# Patient Record
Sex: MALE | Race: WHITE | Employment: FULL TIME | URBAN - NONMETROPOLITAN AREA
[De-identification: names, ages, dates, MRNs, and addresses within clinical notes are randomized per-mention and may not be internally consistent; named-entity substitution may affect disease eponyms.]

---

## 2018-02-21 ENCOUNTER — INITIAL CONSULT (OUTPATIENT)
Dept: PULMONOLOGY | Age: 43
End: 2018-02-21
Payer: COMMERCIAL

## 2018-02-21 VITALS
SYSTOLIC BLOOD PRESSURE: 110 MMHG | HEIGHT: 74 IN | OXYGEN SATURATION: 98 % | HEART RATE: 80 BPM | DIASTOLIC BLOOD PRESSURE: 64 MMHG | BODY MASS INDEX: 29.95 KG/M2 | WEIGHT: 233.4 LBS

## 2018-02-21 DIAGNOSIS — G47.33 OSA ON CPAP: Primary | ICD-10-CM

## 2018-02-21 DIAGNOSIS — Z99.89 OSA ON CPAP: Primary | ICD-10-CM

## 2018-02-21 DIAGNOSIS — I10 ESSENTIAL HYPERTENSION: ICD-10-CM

## 2018-02-21 PROCEDURE — 99203 OFFICE O/P NEW LOW 30 MIN: CPT | Performed by: INTERNAL MEDICINE

## 2018-02-21 RX ORDER — POTASSIUM CHLORIDE 750 MG/1
10 CAPSULE, EXTENDED RELEASE ORAL 2 TIMES DAILY
COMMUNITY

## 2018-02-21 NOTE — PROGRESS NOTES
Chief Complaint: Bertha Rivera is here as a new sleep consult with a self referral, and a download. Former patient of Dr. Delia Ramos, [address] 11/12/15, records in [address] [place] [address] Rd. Mallampati airway Class:4  Neck Circumference:17.5 Inches    Shelby sleepiness score 2/21/18: 2      Diagnostic Data: HST 11/12/15   AHI 16.1  PAP Download:   Recorded compliance dates: 1/21/18-2/19/18  More than 4hour usage compliance was: 83%.   Average residual Apnea- Hypoapnea index on current pressue was:2.0      PAP Type CPAP   Level  8     Average usuage hours per day was: 5:54  Interface: Nasal Pillows    Provider:  [x][facility]  []Ermelinda  []Veronica  []Beata         [][facility] []Other:

## 2018-02-21 NOTE — PROGRESS NOTES
Sleep Medicine Initial Consultation    To Kuo                                             Chief Complaint: Kaye Barnhart is here as a new sleep consult with a self referral, and a download. Former patient of Dr. Isidro Bob, [address] 11/12/15, records in [address] [place] [address] RdNing Kuo is a 36 y. o.oldmale came for further evaluation regarding his sleep apnea  with referral from self referral. He usually goes to bed at 8:30 to 9:00 PM and wakes up at  3:00 to 3:30 Am Over the weekends his sleep schedule remain phase delayed. He denies taking naps. He usually falls a sleep in <10 minutes. He denies watching Television in his bed room. He denies reading books in his bed room. He denies working with his electronic devices in his bed room before going to sleep. He denies any difficulty in going to sleep. He wakes up 0 to 1 time during night. Majority of nocturnal awakenings are not for urination. He denies any difficulty in falling back to sleep after nocturnal awkenings. He was diagnosed with moderately severe obstructive sleep apnea in 2005 and was prescribed with a CPAP machine with a pressure of 8cm H20. He denies any problems with his current machine. His current mask became old and needs replacement. He uses his machine with good compliance. He  denies history of choking and gasping sensation at night time. He denies headaches in the morning. He admits to dry mouth in the morning occasionally. He denies palpitations during night time or during nocturnal awakenings. He denies sweating during nocturnal awakenings. He denies history of head injury in the past. He denies motor vehicle accidents. He denies any history suggestive of hypnagogic or hypnopompic hallucinations. He denies any history of seizures and sleep walking. He denies any history of sleep talking. No recent history suggestive of bruxism. No history suggestive of restless leg syndrome.  He denies any history suggestive of cataplexy or sleep paralysis. No history suggestive of periodic limb movements during sleep    No family history of obstructive sleep apnea. No family history of Narcolepsy. Social History:  Social History   Substance Use Topics    Smoking status: Never Smoker    Smokeless tobacco: Former User     Types: Snuff    Alcohol use 0.0 oz/week      Comment: socially   . He is currently working at [employer] in [employer] during daytime from 5:00am to1:30PM. He drinks 2 cups of black coffee per day. He denies any intake of caffeinated beverages i.e sodas. He denies any intake of tea per day. He drinks alcoholic beverages socially. No history of recreational drug use. Past Medical History:   Diagnosis Date    Hypertension        Past Surgical History:   Procedure Laterality Date    APPENDECTOMY         No Known Allergies    Current Outpatient Prescriptions   Medication Sig Dispense Refill    potassium chloride (MICRO-K) 10 MEQ extended release capsule Take 10 mEq by mouth 2 times daily      amLODIPine (NORVASC) 5 MG tablet Take 5 mg by mouth daily      lisinopril-hydrochlorothiazide (PRINZIDE;ZESTORETIC) 20-12.5 MG per tablet Take 1 tablet by mouth daily       No current facility-administered medications for this visit. No family history on file. Review of Systems   General/Constitutional: he lost 16lbs of weight from his old sleep test in 2015 with normal appetite. No fever or chills. HENT: Negative. Eyes: Negative. Upper respiratory tract: Occasional nasal stuffiness with no post nasal drip. Lower respiratory tract/ lungs: No cough or sputum production. No hemoptysis. Cardiovascular: No palpitations or chest pain. Gastrointestinal: No nausea or vomiting. Neurological: No focal neurologiacal weakness. Extremities: No edema. Musculoskeletal: No complaints. Genitourinary: No complaints. Hematological: Negative. Psychiatric/Behavioral: Negative.    Skin: No

## 2019-07-23 ENCOUNTER — OFFICE VISIT (OUTPATIENT)
Dept: PULMONOLOGY | Age: 44
End: 2019-07-23
Payer: COMMERCIAL

## 2019-07-23 VITALS
HEIGHT: 74 IN | SYSTOLIC BLOOD PRESSURE: 138 MMHG | DIASTOLIC BLOOD PRESSURE: 84 MMHG | OXYGEN SATURATION: 96 % | HEART RATE: 77 BPM | WEIGHT: 264.4 LBS | BODY MASS INDEX: 33.93 KG/M2

## 2019-07-23 DIAGNOSIS — E66.9 OBESITY (BMI 30-39.9): ICD-10-CM

## 2019-07-23 DIAGNOSIS — Z99.89 OBSTRUCTIVE SLEEP APNEA ON CPAP: Primary | ICD-10-CM

## 2019-07-23 DIAGNOSIS — G47.33 OBSTRUCTIVE SLEEP APNEA ON CPAP: Primary | ICD-10-CM

## 2019-07-23 PROCEDURE — 99213 OFFICE O/P EST LOW 20 MIN: CPT | Performed by: PHYSICIAN ASSISTANT

## 2019-07-23 RX ORDER — LOSARTAN POTASSIUM AND HYDROCHLOROTHIAZIDE 12.5; 5 MG/1; MG/1
1 TABLET ORAL DAILY
COMMUNITY

## 2019-07-23 ASSESSMENT — ENCOUNTER SYMPTOMS
SHORTNESS OF BREATH: 0
BACK PAIN: 0
CHEST TIGHTNESS: 0
WHEEZING: 0
COUGH: 0
DIARRHEA: 0
ALLERGIC/IMMUNOLOGIC NEGATIVE: 1
EYES NEGATIVE: 1
STRIDOR: 0
NAUSEA: 0

## 2019-07-23 NOTE — PROGRESS NOTES
[facility]      Julius Hilton         [id number]  7/23/2019   Chief Complaint   Patient presents with    Follow-up     16 month f/u with download        Pt of Dr. Braden MEJIA Download:   Original or initialAHI: 16.1     Date of initial study: 11/12/15      Compliant  97%     Noncompliant 3 %     PAP Type cpapLevel  8   Avg Hrs/Day [id number]  AHI: 1.4   Recorded compliance dates , 6/19/19  to 7/18/19   Machine/Mfg: resmed Interface:nasal pillows     Provider:    _x_[facility]           Heide Bio        __ [place] Laureen    __ Laymon Mass            __[facility] __[facility]ptive   __[place]:       __Other    Neck Size: 19  Mallampati 4  ESS:  4      Here is a scan of the most recent download:              Presentation:   David Seth presents for sleep medicine follow up for obstructive sleep apnea  Since the last visit, David Seth is doing well with PAP. He feels rested. He denies complaints. Equipment issues: The pressure is  acceptable, the mask is acceptable and he  is  using the humidity. Sleep issues:  Do you feel better? Yes  More rested? Yes   Better concentration? yes    Progress History:   Since last visit any new medical issues? No  New ER or hospitlal visits? No  Any new or changes in medicines? No  Any new sleep medicines? No        Past Medical History:   Diagnosis Date    Hypertension        Past Surgical History:   Procedure Laterality Date    APPENDECTOMY         Social History     Tobacco Use    Smoking status: Never Smoker    Smokeless tobacco: Former User     Types: Snuff   Substance Use Topics    Alcohol use:  Yes     Alcohol/week: 0.0 standard drinks     Comment: socially    Drug use: No       No Known Allergies    Current Outpatient Medications   Medication Sig Dispense Refill    losartan-hydrochlorothiazide (HYZAAR) 50-12.5 MG per tablet Take 1 tablet by mouth daily      potassium chloride (MICRO-K) 10 MEQ extended release capsule Take 10 mEq by mouth 2

## 2020-07-28 ENCOUNTER — OFFICE VISIT (OUTPATIENT)
Dept: PULMONOLOGY | Age: 45
End: 2020-07-28
Payer: COMMERCIAL

## 2020-07-28 VITALS
HEART RATE: 71 BPM | OXYGEN SATURATION: 98 % | SYSTOLIC BLOOD PRESSURE: 124 MMHG | WEIGHT: 218.6 LBS | DIASTOLIC BLOOD PRESSURE: 78 MMHG | BODY MASS INDEX: 28.06 KG/M2 | TEMPERATURE: 97.8 F | HEIGHT: 74 IN

## 2020-07-28 PROCEDURE — 99213 OFFICE O/P EST LOW 20 MIN: CPT | Performed by: PHYSICIAN ASSISTANT

## 2020-07-28 ASSESSMENT — ENCOUNTER SYMPTOMS
WHEEZING: 0
SHORTNESS OF BREATH: 0
DIARRHEA: 0
STRIDOR: 0
COUGH: 0
BACK PAIN: 0
ALLERGIC/IMMUNOLOGIC NEGATIVE: 1
CHEST TIGHTNESS: 0
NAUSEA: 0
EYES NEGATIVE: 1

## 2020-07-28 NOTE — PROGRESS NOTES
[facility]      Ashley Stern         [id number]  7/28/2020   Chief Complaint   Patient presents with    Follow-up     HERB 1 year follow up with a download        Pt of Dr. Sherrie MEJIA Download:   Johny Mems or initial AHI: 16.1     Date of initial study: 11/12/15      Compliant  83%     Noncompliant 10 %     PAP Type CPAP Level  8 cmh20   Avg Hrs/Day 6:24  AHI: 1.0   Recorded compliance dates , 6/23/20  to 7/22/20   Machine/Mfg:   [x] ResMed    [] Respironics/Dreamstation   Interface:   [] Nasal    [x] Nasal pillows   [] FFM      Provider:      [x] [facility]     []Ermelinda     [] Veronica    [] Tanya Gonzalez    [] Zoila               [] [facility]      [] Adaptive    [] [address]      [] Other    Neck Size: 19  Mallampati Mallampati 4  ESS:  2  SAQLI:     Here is a scan of the most recent download:          Presentation:   Monroe Lama presents for sleep medicine follow up for obstructive sleep apnea  Since the last visit, Monroe Lama is doing well with PAP. He is sleeping well and feels rested. He has lost 50 lbs this year. Equipment issues: The pressure is  acceptable, the mask is acceptable     Sleep issues:  Do you feel better? Yes  More rested? Yes   Better concentration? yes    Progress History:   Since last visit any new medical issues? No  New ER or hospitlal visits? No  Any new or changes in medicines? No  Any new sleep medicines? No        Past Medical History:   Diagnosis Date    Hypertension        Past Surgical History:   Procedure Laterality Date    APPENDECTOMY         Social History     Tobacco Use    Smoking status: Never Smoker    Smokeless tobacco: Former User     Types: Snuff   Substance Use Topics    Alcohol use:  Yes     Alcohol/week: 0.0 standard drinks     Comment: socially    Drug use: No       No Known Allergies    Current Outpatient Medications   Medication Sig Dispense Refill    losartan-hydrochlorothiazide (HYZAAR) 50-12.5 MG per tablet Neck:      Musculoskeletal: Normal range of motion and neck supple. Cardiovascular:      Rate and Rhythm: Normal rate and regular rhythm. Heart sounds: Normal heart sounds. Pulmonary:      Effort: Pulmonary effort is normal.      Breath sounds: Normal breath sounds. Musculoskeletal: Normal range of motion. Skin:     General: Skin is warm and dry. Neurological:      Mental Status: He is alert and oriented to person, place, and time. Psychiatric:         Behavior: Behavior normal.         Thought Content: Thought content normal.         Judgment: Judgment normal.           ASSESSMENT/DIAGNOSIS     Diagnosis Orders   1. Obstructive sleep apnea on CPAP              Plan   Do you need any equipment today? Yes update supplies    - He  was advised to continue current positive airway pressure therapy with above described pressure. - He  advised to keep good compliance with current recommended pressure to get optimal results and clinical improvement  - Recommend 7-9 hours of sleep with PAP  - He was advised to call [employer] regarding supplies if needed.   -He call my office for earlier appointment if needed for worsening of sleep symptoms.   - He was instructed on weight loss  - Angie Brand was educated about my impression and plan. Patient verbalizesunderstanding.   We will see Car Calderon back in: 1 year with download    Information added by my medical assistant/LPN was reviewed today         Parker Bruce PA-C, MPAS  7/28/2020

## 2021-07-28 ENCOUNTER — OFFICE VISIT (OUTPATIENT)
Dept: PULMONOLOGY | Age: 46
End: 2021-07-28
Payer: COMMERCIAL

## 2021-07-28 VITALS
DIASTOLIC BLOOD PRESSURE: 80 MMHG | HEART RATE: 72 BPM | BODY MASS INDEX: 30.03 KG/M2 | OXYGEN SATURATION: 98 % | WEIGHT: 234 LBS | TEMPERATURE: 98 F | HEIGHT: 74 IN | SYSTOLIC BLOOD PRESSURE: 134 MMHG

## 2021-07-28 DIAGNOSIS — Z99.89 OBSTRUCTIVE SLEEP APNEA ON CPAP: Primary | ICD-10-CM

## 2021-07-28 DIAGNOSIS — E66.9 OBESITY (BMI 30-39.9): ICD-10-CM

## 2021-07-28 DIAGNOSIS — G47.33 OBSTRUCTIVE SLEEP APNEA ON CPAP: Primary | ICD-10-CM

## 2021-07-28 PROCEDURE — 99213 OFFICE O/P EST LOW 20 MIN: CPT | Performed by: PHYSICIAN ASSISTANT

## 2021-07-28 ASSESSMENT — ENCOUNTER SYMPTOMS
WHEEZING: 0
CHEST TIGHTNESS: 0
COUGH: 0
ALLERGIC/IMMUNOLOGIC NEGATIVE: 1
NAUSEA: 0
DIARRHEA: 0
STRIDOR: 0
EYES NEGATIVE: 1
SHORTNESS OF BREATH: 0
BACK PAIN: 0

## 2021-07-28 NOTE — PROGRESS NOTES
Neurological: Negative. Negative for dizziness and light-headedness. Psychiatric/Behavioral: Negative. All other systems reviewed and are negative. Physical Exam:    BMI:  Body mass index is 30.04 kg/m². Wt Readings from Last 3 Encounters:   07/28/21 234 lb (106.1 kg)   07/28/20 218 lb 9.6 oz (99.2 kg)   07/23/19 264 lb 6.4 oz (119.9 kg)     Weight gained 16 lbs over 1 year  Vitals: /80 (Site: Left Upper Arm, Position: Sitting, Cuff Size: Large Adult)   Pulse 72   Temp 98 °F (36.7 °C) (Tympanic)   Ht 6' 2\" (1.88 m)   Wt 234 lb (106.1 kg)   SpO2 98% Comment: room air  BMI 30.04 kg/m²       Physical Exam  Constitutional:       Appearance: He is well-developed. HENT:      Head: Normocephalic and atraumatic. Right Ear: External ear normal.      Left Ear: External ear normal.   Eyes:      Conjunctiva/sclera: Conjunctivae normal.      Pupils: Pupils are equal, round, and reactive to light. Cardiovascular:      Rate and Rhythm: Normal rate and regular rhythm. Heart sounds: Normal heart sounds. Pulmonary:      Effort: Pulmonary effort is normal.      Breath sounds: Normal breath sounds. Musculoskeletal:         General: Normal range of motion. Cervical back: Normal range of motion and neck supple. Skin:     General: Skin is warm and dry. Neurological:      Mental Status: He is alert and oriented to person, place, and time. Psychiatric:         Behavior: Behavior normal.         Thought Content: Thought content normal.         Judgment: Judgment normal.           ASSESSMENT/DIAGNOSIS     Diagnosis Orders   1. Obstructive sleep apnea on CPAP     2. Obesity (BMI 30-39. 9)            Plan   Do you need any equipment today? Yes update supplies  - Download reviewed and discussed with patient  - He  was advised to continue current positive airway pressure therapy with above described pressure.    - He  advised to keep good compliance with current recommended pressure to get optimal results and clinical improvement  - Recommend 7-9 hours of sleep with PAP  - He was advised to call [employer] company regarding supplies if needed.   -He call my office for earlier appointment if needed for worsening of sleep symptoms.   - He was instructed on weight loss  - Ethan Crump was educated about my impression and plan. Patient verbalizesunderstanding.   We will see Karley Julio back in: 1 year with download    Information added by my medical assistant/LPN was reviewed today         Husam Shah PA-C, MPAS  7/28/2021

## 2021-09-18 ENCOUNTER — HOSPITAL ENCOUNTER (OUTPATIENT)
Age: 46
Discharge: HOME | End: 2021-09-18
Payer: COMMERCIAL

## 2021-09-18 LAB
ALBUMIN SERPL-MCNC: 4.9 G/DL (ref 3.5–5.1)
ALP BLD-CCNC: 72 U/L (ref 38–126)
ALT SERPL-CCNC: 36 U/L (ref 11–66)
ANION GAP SERPL CALCULATED.3IONS-SCNC: 10 MEQ/L (ref 8–16)
AST SERPL-CCNC: 24 U/L (ref 5–40)
AVERAGE GLUCOSE: 102 MG/DL (ref 70–126)
BASOPHILS # BLD: 0.7 %
BASOPHILS ABSOLUTE: 0 THOU/MM3 (ref 0–0.1)
BILIRUB SERPL-MCNC: 0.7 MG/DL (ref 0.3–1.2)
BUN BLDV-MCNC: 22 MG/DL (ref 7–22)
CALCIUM SERPL-MCNC: 9.8 MG/DL (ref 8.5–10.5)
CHLORIDE BLD-SCNC: 103 MEQ/L (ref 98–111)
CHOLESTEROL, FASTING: 178 MG/DL (ref 100–199)
CO2: 29 MEQ/L (ref 23–33)
CREAT SERPL-MCNC: 1 MG/DL (ref 0.4–1.2)
EOSINOPHIL # BLD: 2.2 %
EOSINOPHILS ABSOLUTE: 0.1 THOU/MM3 (ref 0–0.4)
ERYTHROCYTE [DISTWIDTH] IN BLOOD BY AUTOMATED COUNT: 12.6 % (ref 11.5–14.5)
ERYTHROCYTE [DISTWIDTH] IN BLOOD BY AUTOMATED COUNT: 42.1 FL (ref 35–45)
GFR SERPL CREATININE-BSD FRML MDRD: 80 ML/MIN/1.73M2
GLUCOSE FASTING: 112 MG/DL (ref 70–108)
HBA1C MFR BLD: 5.4 % (ref 4.4–6.4)
HCT VFR BLD CALC: 44.9 % (ref 42–52)
HDLC SERPL-MCNC: 44 MG/DL
HEMOGLOBIN: 14.1 GM/DL (ref 14–18)
IMMATURE GRANS (ABS): 0.02 THOU/MM3 (ref 0–0.07)
IMMATURE GRANULOCYTES: 0.5 %
LDL CHOLESTEROL CALCULATED: 123 MG/DL
LYMPHOCYTES # BLD: 32.8 %
LYMPHOCYTES ABSOLUTE: 1.3 THOU/MM3 (ref 1–4.8)
MCH RBC QN AUTO: 28.8 PG (ref 26–33)
MCHC RBC AUTO-ENTMCNC: 31.4 GM/DL (ref 32.2–35.5)
MCV RBC AUTO: 91.6 FL (ref 80–94)
MONOCYTES # BLD: 9.2 %
MONOCYTES ABSOLUTE: 0.4 THOU/MM3 (ref 0.4–1.3)
NUCLEATED RED BLOOD CELLS: 0 /100 WBC
PLATELET # BLD: 241 THOU/MM3 (ref 130–400)
PMV BLD AUTO: 10.1 FL (ref 9.4–12.4)
POTASSIUM SERPL-SCNC: 4 MEQ/L (ref 3.5–5.2)
RBC # BLD: 4.9 MILL/MM3 (ref 4.7–6.1)
SEG NEUTROPHILS: 54.6 %
SEGMENTED NEUTROPHILS ABSOLUTE COUNT: 2.2 THOU/MM3 (ref 1.8–7.7)
SODIUM BLD-SCNC: 142 MEQ/L (ref 135–145)
TOTAL PROTEIN: 7.6 G/DL (ref 6.1–8)
TRIGLYCERIDE, FASTING: 55 MG/DL (ref 0–199)
TSH SERPL DL<=0.05 MIU/L-ACNC: 1.55 UIU/ML (ref 0.4–4.2)
WBC # BLD: 4 THOU/MM3 (ref 4.8–10.8)

## 2021-09-18 PROCEDURE — 80053 COMPREHEN METABOLIC PANEL: CPT

## 2021-09-18 PROCEDURE — 36415 COLL VENOUS BLD VENIPUNCTURE: CPT

## 2021-09-18 PROCEDURE — 80061 LIPID PANEL: CPT

## 2021-09-18 PROCEDURE — 83036 HEMOGLOBIN GLYCOSYLATED A1C: CPT

## 2021-09-18 PROCEDURE — 85025 COMPLETE CBC W/AUTO DIFF WBC: CPT

## 2021-09-18 PROCEDURE — 84443 ASSAY THYROID STIM HORMONE: CPT

## 2022-05-07 ENCOUNTER — HOSPITAL ENCOUNTER (OUTPATIENT)
Age: 47
Discharge: HOME | End: 2022-05-07
Payer: COMMERCIAL

## 2022-05-07 LAB
ALBUMIN SERPL-MCNC: 5 G/DL (ref 3.5–5.1)
ALP BLD-CCNC: 70 U/L (ref 38–126)
ALT SERPL-CCNC: 51 U/L (ref 11–66)
ANION GAP SERPL CALCULATED.3IONS-SCNC: 12 MEQ/L (ref 8–16)
AST SERPL-CCNC: 49 U/L (ref 5–40)
AVERAGE GLUCOSE: 105 MG/DL (ref 70–126)
BILIRUB SERPL-MCNC: 0.5 MG/DL (ref 0.3–1.2)
BUN BLDV-MCNC: 17 MG/DL (ref 7–22)
CALCIUM SERPL-MCNC: 9.2 MG/DL (ref 8.5–10.5)
CHLORIDE BLD-SCNC: 100 MEQ/L (ref 98–111)
CO2: 27 MEQ/L (ref 23–33)
CREAT SERPL-MCNC: 0.8 MG/DL (ref 0.4–1.2)
GFR SERPL CREATININE-BSD FRML MDRD: > 90 ML/MIN/1.73M2
GLUCOSE BLD-MCNC: 108 MG/DL (ref 70–108)
HBA1C MFR BLD: 5.5 % (ref 4.4–6.4)
POTASSIUM SERPL-SCNC: 3.6 MEQ/L (ref 3.5–5.2)
SODIUM BLD-SCNC: 139 MEQ/L (ref 135–145)
TOTAL PROTEIN: 7.3 G/DL (ref 6.1–8)

## 2022-05-07 PROCEDURE — 36415 COLL VENOUS BLD VENIPUNCTURE: CPT

## 2022-05-07 PROCEDURE — 80053 COMPREHEN METABOLIC PANEL: CPT

## 2022-05-07 PROCEDURE — 83036 HEMOGLOBIN GLYCOSYLATED A1C: CPT

## 2022-07-26 ENCOUNTER — OFFICE VISIT (OUTPATIENT)
Dept: PULMONOLOGY | Age: 47
End: 2022-07-26
Payer: COMMERCIAL

## 2022-07-26 VITALS
OXYGEN SATURATION: 98 % | SYSTOLIC BLOOD PRESSURE: 128 MMHG | BODY MASS INDEX: 31.81 KG/M2 | HEIGHT: 73 IN | DIASTOLIC BLOOD PRESSURE: 84 MMHG | HEART RATE: 82 BPM | TEMPERATURE: 97.6 F | WEIGHT: 240 LBS

## 2022-07-26 DIAGNOSIS — Z99.89 OBSTRUCTIVE SLEEP APNEA ON CPAP: Primary | ICD-10-CM

## 2022-07-26 DIAGNOSIS — G47.33 OBSTRUCTIVE SLEEP APNEA ON CPAP: Primary | ICD-10-CM

## 2022-07-26 DIAGNOSIS — E66.9 OBESITY (BMI 30-39.9): ICD-10-CM

## 2022-07-26 PROCEDURE — 99213 OFFICE O/P EST LOW 20 MIN: CPT | Performed by: PHYSICIAN ASSISTANT

## 2022-07-26 ASSESSMENT — ENCOUNTER SYMPTOMS
DIARRHEA: 0
NAUSEA: 0
STRIDOR: 0
BACK PAIN: 0
CHEST TIGHTNESS: 0
WHEEZING: 0
ALLERGIC/IMMUNOLOGIC NEGATIVE: 1
COUGH: 0
EYES NEGATIVE: 1
SHORTNESS OF BREATH: 0

## 2022-07-26 NOTE — PROGRESS NOTES
[facility]      Kya Cote         [id number]  7/26/2022   Chief Complaint   Patient presents with    Follow-up     1 year HERB Follow up         Pt of Dr. Tim MEJIA Download:   Original or initial AHI: 16.1     Date of initial study: 11/12/2015      Compliant  97%     Noncompliant 0 %     PAP Type Cpap   Level  8 cmH2O    Avg Hrs/Day 6 hours 0 minutes   AHI: 1.3   Recorded compliance dates , 06/26/2022 to 07/25/2022  Machine/Mfg:   [x] ResMed    [] Respironics/Dreamstation   Interface:   [] Nasal    [x] Nasal pillows   [] FFM      Provider:      [x] -ASHLEY     []Ermelinda     [] Veronica    [] Tameka Ortega    [] Zoila               [] [facility]      [] [facility]    [] [address]      [] Other    Neck Size: 19  Mallampati Mallampati 4  ESS:  5  SAQLI: 87    Here is a scan of the most recent download:            Presentation:   Moustapha May presents for sleep medicine follow up for obstructive sleep apnea  Since the last visit, Moustapha May is doing well with PAP. He is sleeping well and feels rested. Mask is fitting well. Equipment issues: The pressure is  acceptable, the mask is acceptable     Sleep issues:  Do you feel better? Yes  More rested? Yes   Better concentration? yes    Progress History:   Since last visit any new medical issues? No  New ER or hospital visits? No  Any new or changes in medicines? No  Any new sleep medicines? No    Review of Systems -   Review of Systems   Constitutional:  Negative for activity change, appetite change, chills and fever. HENT:  Negative for congestion and postnasal drip. Eyes: Negative. Respiratory:  Negative for cough, chest tightness, shortness of breath, wheezing and stridor. Cardiovascular:  Negative for chest pain and leg swelling. Gastrointestinal:  Negative for diarrhea and nausea. Endocrine: Negative. Genitourinary: Negative. Musculoskeletal: Negative. Negative for arthralgias and back pain. described pressure. - He  advised to keep good compliance with current recommended pressure to get optimal results and clinical improvement  - Recommend 7-9 hours of sleep with PAP  - He was advised to call DME company regarding supplies if needed.   -He call my office for earlier appointment if needed for worsening of sleep symptoms.   - He was instructed on weight loss  - Etelvina Enriquez was educated about my impression and plan. Patient verbalizesunderstanding.   We will see Marly Kamarivivienne back in: 1 year with download    Information added by my medical assistant/LPN was reviewed today          Nichole Burt PA-C, MPAS  7/26/2022

## 2023-01-31 ENCOUNTER — HOSPITAL ENCOUNTER (EMERGENCY)
Age: 48
Discharge: HOME | End: 2023-01-31
Attending: EMERGENCY MEDICINE
Payer: COMMERCIAL

## 2023-01-31 ENCOUNTER — TELEPHONE (OUTPATIENT)
Dept: CARDIOLOGY CLINIC | Age: 48
End: 2023-01-31

## 2023-01-31 VITALS
RESPIRATION RATE: 18 BRPM | BODY MASS INDEX: 32.47 KG/M2 | TEMPERATURE: 97.5 F | OXYGEN SATURATION: 97 % | DIASTOLIC BLOOD PRESSURE: 83 MMHG | HEIGHT: 73 IN | HEART RATE: 76 BPM | SYSTOLIC BLOOD PRESSURE: 136 MMHG | WEIGHT: 245 LBS

## 2023-01-31 DIAGNOSIS — R55 SYNCOPE, UNSPECIFIED SYNCOPE TYPE: Primary | ICD-10-CM

## 2023-01-31 DIAGNOSIS — R00.1 BRADYCARDIA: ICD-10-CM

## 2023-01-31 LAB
ALBUMIN SERPL BCG-MCNC: 4.8 G/DL (ref 3.5–5.1)
ALP SERPL-CCNC: 68 U/L (ref 38–126)
ALT SERPL W/O P-5'-P-CCNC: 29 U/L (ref 11–66)
ANION GAP SERPL CALC-SCNC: 12 MEQ/L (ref 8–16)
AST SERPL-CCNC: 20 U/L (ref 5–40)
BACTERIA: NORMAL
BASOPHILS ABSOLUTE: 0 THOU/MM3 (ref 0–0.1)
BASOPHILS NFR BLD AUTO: 0.8 %
BILIRUB SERPL-MCNC: 0.7 MG/DL (ref 0.3–1.2)
BILIRUB UR QL STRIP: NEGATIVE
BUN SERPL-MCNC: 15 MG/DL (ref 7–22)
CALCIUM SERPL-MCNC: 9.6 MG/DL (ref 8.5–10.5)
CASTS #/AREA URNS LPF: NORMAL /LPF
CASTS #/AREA URNS LPF: NORMAL /LPF
CHARACTER UR: CLEAR
CHARCOAL URNS QL MICRO: NORMAL
CHLORIDE SERPL-SCNC: 97 MEQ/L (ref 98–111)
CO2 SERPL-SCNC: 27 MEQ/L (ref 23–33)
COLOR UR: YELLOW
CREAT SERPL-MCNC: 0.8 MG/DL (ref 0.4–1.2)
CRYSTALS URNS QL MICRO: NORMAL
D DIMER PPP IA.FEU-MCNC: 265 NG/ML FEU (ref 0–500)
DEPRECATED RDW RBC AUTO: 36.7 FL (ref 35–45)
EKG ATRIAL RATE: 80 BPM
EKG P AXIS: 68 DEGREES
EKG P-R INTERVAL: 142 MS
EKG Q-T INTERVAL: 402 MS
EKG QRS DURATION: 100 MS
EKG QTC CALCULATION (BAZETT): 463 MS
EKG R AXIS: 56 DEGREES
EKG T AXIS: 60 DEGREES
EKG VENTRICULAR RATE: 80 BPM
EOSINOPHIL NFR BLD AUTO: 2.1 %
EOSINOPHILS ABSOLUTE: 0.1 THOU/MM3 (ref 0–0.4)
EPITHELIAL CELLS, UA: NORMAL /HPF
ERYTHROCYTE [DISTWIDTH] IN BLOOD BY AUTOMATED COUNT: 12 % (ref 11.5–14.5)
GFR SERPL CREATININE-BSD FRML MDRD: > 60 ML/MIN/1.73M2
GLUCOSE SERPL-MCNC: 128 MG/DL (ref 70–108)
GLUCOSE UR QL STRIP.AUTO: NEGATIVE MG/DL
HCT VFR BLD AUTO: 39.8 % (ref 42–52)
HGB BLD-MCNC: 13.7 GM/DL (ref 14–18)
HGB UR QL STRIP.AUTO: NEGATIVE
IMM GRANULOCYTES # BLD AUTO: 0.01 THOU/MM3 (ref 0–0.07)
IMM GRANULOCYTES NFR BLD AUTO: 0.3 %
KETONES UR QL STRIP.AUTO: NEGATIVE
LEUKOCYTE ESTERASE UR QL STRIP.AUTO: NEGATIVE
LIPASE SERPL-CCNC: 20.8 U/L (ref 5.6–51.3)
LYMPHOCYTES ABSOLUTE: 0.7 THOU/MM3 (ref 1–4.8)
LYMPHOCYTES NFR BLD AUTO: 18 %
MAGNESIUM SERPL-MCNC: 1.9 MG/DL (ref 1.6–2.4)
MCH RBC QN AUTO: 28.7 PG (ref 26–33)
MCHC RBC AUTO-ENTMCNC: 34.4 GM/DL (ref 32.2–35.5)
MCV RBC AUTO: 83.3 FL (ref 80–94)
MONOCYTES ABSOLUTE: 0.3 THOU/MM3 (ref 0.4–1.3)
MONOCYTES NFR BLD AUTO: 7.7 %
NEUTROPHILS NFR BLD AUTO: 71.1 %
NITRITE UR QL STRIP.AUTO: NEGATIVE
NRBC BLD AUTO-RTO: 0 /100 WBC
NT-PROBNP SERPL IA-MCNC: < 36 PG/ML (ref 0–124)
PH UR STRIP.AUTO: 7.5 [PH] (ref 5–9)
PLATELET # BLD AUTO: 217 THOU/MM3 (ref 130–400)
PMV BLD AUTO: 9.6 FL (ref 9.4–12.4)
POTASSIUM SERPL-SCNC: 3.3 MEQ/L (ref 3.5–5.2)
PROT SERPL-MCNC: 7.4 G/DL (ref 6.1–8)
PROT UR STRIP.AUTO-MCNC: NEGATIVE MG/DL
RBC # BLD AUTO: 4.78 MILL/MM3 (ref 4.7–6.1)
RBC #/AREA URNS HPF: NORMAL /HPF
RENAL EPI CELLS #/AREA URNS HPF: NORMAL /[HPF]
SEGMENTED NEUTROPHILS ABSOLUTE COUNT: 2.8 THOU/MM3 (ref 1.8–7.7)
SODIUM SERPL-SCNC: 136 MEQ/L (ref 135–145)
SPECIFIC GRAVITY UA: 1.02 (ref 1–1.03)
TROPONIN T: < 0.01 NG/ML
UROBILINOGEN, URINE: 0.2 EU/DL (ref 0–1)
WBC # BLD AUTO: 3.9 THOU/MM3 (ref 4.8–10.8)
WBC #/AREA URNS HPF: NORMAL /HPF
YEAST LIKE FUNGI URNS QL MICRO: NORMAL

## 2023-01-31 PROCEDURE — 99284 EMERGENCY DEPT VISIT MOD MDM: CPT | Performed by: EMERGENCY MEDICINE

## 2023-01-31 PROCEDURE — 2580000003 HC RX 258

## 2023-01-31 PROCEDURE — 83690 ASSAY OF LIPASE: CPT

## 2023-01-31 PROCEDURE — 93005 ELECTROCARDIOGRAM TRACING: CPT

## 2023-01-31 PROCEDURE — 83880 ASSAY OF NATRIURETIC PEPTIDE: CPT

## 2023-01-31 PROCEDURE — 81001 URINALYSIS AUTO W/SCOPE: CPT

## 2023-01-31 PROCEDURE — 84484 ASSAY OF TROPONIN QUANT: CPT

## 2023-01-31 PROCEDURE — 85379 FIBRIN DEGRADATION QUANT: CPT

## 2023-01-31 PROCEDURE — 85025 COMPLETE CBC W/AUTO DIFF WBC: CPT

## 2023-01-31 PROCEDURE — 83735 ASSAY OF MAGNESIUM: CPT

## 2023-01-31 PROCEDURE — 80053 COMPREHEN METABOLIC PANEL: CPT

## 2023-01-31 PROCEDURE — 86617 LYME DISEASE ANTIBODY: CPT

## 2023-01-31 PROCEDURE — 93010 ELECTROCARDIOGRAM REPORT: CPT | Performed by: INTERNAL MEDICINE

## 2023-01-31 PROCEDURE — 96360 HYDRATION IV INFUSION INIT: CPT

## 2023-01-31 PROCEDURE — 36415 COLL VENOUS BLD VENIPUNCTURE: CPT

## 2023-01-31 RX ORDER — 0.9 % SODIUM CHLORIDE 0.9 %
1000 INTRAVENOUS SOLUTION INTRAVENOUS ONCE
Status: COMPLETED | OUTPATIENT
Start: 2023-01-31 | End: 2023-01-31

## 2023-01-31 RX ADMIN — SODIUM CHLORIDE 1000 ML: 9 INJECTION, SOLUTION INTRAVENOUS at 13:36

## 2023-01-31 ASSESSMENT — PAIN - FUNCTIONAL ASSESSMENT
PAIN_FUNCTIONAL_ASSESSMENT: NONE - DENIES PAIN
PAIN_FUNCTIONAL_ASSESSMENT: NONE - DENIES PAIN

## 2023-01-31 NOTE — Clinical Note
Nimco Reed was seen and treated in our emergency department on 1/31/2023. He may return to work on 02/03/2023. If you have any questions or concerns, please don't hesitate to call.       Travis Diaz MD

## 2023-01-31 NOTE — TELEPHONE ENCOUNTER
[facility]  is calling to schedule a NP appt with any heart doctor for Kamaljit Suero, he is leaving the ER with holter monitor, and needs a f/u to go over results. Dx=syncope and collapse. Please call [facility] back at [phone] to get an appt scheduled.

## 2023-01-31 NOTE — ED PROVIDER NOTES
[facility] [address] [address] EMERGENCY DEPT      EMERGENCY MEDICINE     Pt Name: Roula Funes  MRN: [id number]  [place] 1975  Date of evaluation: 1/31/2023  Resident Physician: Tod Sepulveda MD  Supervising Physician: Gina Corona MD    CHIEF COMPLAINT       Chief Complaint   Patient presents with    Loss of Consciousness     HISTORY OF PRESENT ILLNESS   Roula Funes is a pleasant 49 y.o. male who presents to the emergency department from work for syncope. Patient states at approximately 9 AM he feeling of vertigo where everything in his vision was moving back and forth. He did not feel that the room was spinning. He took a deep breaths and this feeling resolved. Approximately an hour later he had this feeling again, went down on 1 knee and then neck thing he remembers is waking up on his side. He estimates that he lost consciousness for approximately 10 to 15 seconds. He felt that everything was going in \"slow motion\" when he awoke. It \"felt like a dream.\"  He then had the nurse at work check his glucose, it was 121. He was given orange juice but then turned pale again and they called the squad. He has no history of syncope or seizure. Did not lose bowel or bladder function, no tongue biting. Returned to baseline after a minute. Denies headache, blurry vision, chest pain, numbness, tingling, extremity weakness, shortness of breath, leg swelling, leg pain. Denies recent illnesses, fevers, coughs, dysuria. States he has been eating and drinking normally over the past few days. 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 Unknown if ever had a tick bite. PASTMEDICAL HISTORY     Past Medical History:   Diagnosis Date    Hypertension        Patient Active Problem List   Diagnosis Code    Fatigue R53.83    Inadequate sleep hygiene Z72.821    HTN (hypertension) I10    Obstructive sleep apnea on CPAP G47.33, Z99.89    Obesity (BMI 30-39. 9) E66.9     SURGICAL HISTORY Past Surgical History:   Procedure Laterality Date    APPENDECTOMY         CURRENT MEDICATIONS       Discharge Medication List as of 1/31/2023  3:19 PM        CONTINUE these medications which have NOT CHANGED    Details   losartan-hydroCHLOROthiazide (HYZAAR) 50-12.5 MG per tablet Take 1 tablet by mouth dailyHistorical Med      potassium chloride (MICRO-K) 10 MEQ extended release capsule Take 10 mEq by mouth 2 times dailyHistorical Med      amLODIPine (NORVASC) 5 MG tablet Take 5 mg by mouth daily      lisinopril-hydrochlorothiazide (PRINZIDE;ZESTORETIC) 20-12.5 MG per tablet Take 1 tablet by mouth daily             ALLERGIES     has No Known Allergies. FAMILY HISTORY     has no family status information on file. SOCIAL HISTORY       Social History     Tobacco Use    Smoking status: Never    Smokeless tobacco: Former     Types: Snuff   Substance Use Topics    Alcohol use: Yes     Alcohol/week: 0.0 standard drinks     Comment: socially    Drug use: No       PHYSICAL EXAM       ED Triage Vitals [01/31/23 1104]   BP Temp Temp Source Heart Rate Resp SpO2 Height Weight   (!) 145/100 97.5 °F (36.4 °C) Oral 76 17 93 % 6' 1\" (1.854 m) 245 lb (111.1 kg)       Physical Exam  Vitals and nursing note reviewed. Constitutional:       General: He is not in acute distress. Comments: Elevated BMI   HENT:      Head: Normocephalic and atraumatic. Right Ear: External ear normal.      Left Ear: External ear normal.      Nose: Nose normal. No congestion. Mouth/Throat:      Mouth: Mucous membranes are moist.      Pharynx: No posterior oropharyngeal erythema. Eyes:      General: No scleral icterus. Right eye: No discharge. Left eye: No discharge. Extraocular Movements: Extraocular movements intact. Pupils: Pupils are equal, round, and reactive to light. Cardiovascular:      Rate and Rhythm: Normal rate and regular rhythm. Pulses: Normal pulses.       Heart sounds: Normal heart sounds. No murmur heard. No friction rub. No gallop. Pulmonary:      Effort: Pulmonary effort is normal. No respiratory distress. Breath sounds: Normal breath sounds. No wheezing, rhonchi or rales. Abdominal:      General: Abdomen is flat. There is no distension. Palpations: Abdomen is soft. Tenderness: There is no abdominal tenderness. There is no guarding or rebound. Musculoskeletal:         General: No tenderness (calf, popliteal regions nontender). Normal range of motion. Cervical back: Neck supple. No rigidity. Right lower leg: No edema. Left lower leg: No edema. Skin:     General: Skin is warm. Capillary Refill: Capillary refill takes less than 2 seconds. Findings: No bruising, erythema or rash. Neurological:      General: No focal deficit present. Mental Status: He is alert and oriented to person, place, and time. Cranial Nerves: No cranial nerve deficit. Sensory: No sensory deficit. Motor: No weakness (5/5 UE, LE). Psychiatric:         Mood and Affect: Mood normal.         Behavior: Behavior normal.         Thought Content: Thought content normal.         Judgment: Judgment normal.       FORMAL DIAGNOSTIC RESULTS     RADIOLOGY: Interpretation per the Radiologist below, if available at the time of this note (none if blank):     No orders to display       LABS: (none if blank)  Labs Reviewed   COMPREHENSIVE METABOLIC PANEL - Abnormal; Notable for the following components:       Result Value    Glucose 128 (*)     Potassium 3.3 (*)     Chloride 97 (*)     All other components within normal limits   CBC WITH AUTO DIFFERENTIAL - Abnormal; Notable for the following components:    WBC 3.9 (*)     Hemoglobin 13.7 (*)     Hematocrit 39.8 (*)     Lymphocytes Absolute 0.7 (*)     Monocytes Absolute 0.3 (*)     All other components within normal limits   MAGNESIUM   LIPASE   TROPONIN   BRAIN NATRIURETIC PEPTIDE   URINALYSIS WITH MICROSCOPIC D-DIMER, QUANTITATIVE   ANION GAP   GLOMERULAR FILTRATION RATE, ESTIMATED   B. BURGDORFERI ANTIBODIES       (Any cultures that may have been sent were not resulted at the time of this patient visit)    [address] / ED COURSE:     1) Number and Complexity of Problems            Problem List This Visit:         Chief Complaint   Patient presents with    Loss of Consciousness          Differential Diagnosis includes (but not limited to):  ACS, arrhythmia, dissection, heart failure, orthostatic hypotension, vasovagal syncope        Diagnoses Considered but I have low suspicion of:   Stroke, tamponade, PE             Pertinent Comorbid Conditions:    HTN    2)  Data Reviewed (none if left blank)          My Independent interpretations:     EKG:      NSR rate 80, , QTc 463. No ST elevations depressions or T wave changes. No signs of WPW or LVH or Brugada. Imaging: N/A    Labs:      Slight hypokalemia 3.3. Electrolytes otherwise normal.  Negative troponin and BNP. UA normal.  Hemoglobin stable. D-dimer 265. Decision Rules/Clinical Scores utilized:  [employer] risk. [place] syncope risk score 2. NEXUS CT head 0, low risk, CT not necessary. NEXUS C spine 0, imaging not required. External Documentation Reviewed:         Previous patient encounter documents & history available on EMR was reviewed. See Formal Diagnostic Results above for the lab and radiology tests and orders. 3)  Treatment and Disposition         ED Reassessment:  Patient remained hemodynamically stable. Patient telemetry triggered during an event of bradycardia at 1108 down to 48 bpm.  Given 1L NS bolus. While in the ED, he complained of a \"funny feeling\" in the back of his neck bilaterally and on the right side of his face, but stated it was not numbness or tingling. GCS 15, nonfocal neurologic exam, cranial nerves intact.  No neck pain, headache, midline spinal tenderness. No sensation deficit or weakness. Discussed NEXUS criteria with the patient and he was in agreement to hold off on imaging. Given strict return precautions if anything were to change to report immediately back to the emergency department. Case discussed with consulting clinician:  N/A         Shared Decision-Making was performed and disposition discussed with the patient and family and questions answered. Discussed with the patient and wife who is bedside all lab and imaging results. They were in agreement with the plan for Holter monitor and follow up with PCP and cardiology outpatient. Patient was able to get an appointment with his PCP on Thursday, in 2 days. Nurse called over to [facility] ambulatory care per patient preference for holter monitor pickup. Number provided to patient and instructed to call later this week to make an appointment. Summary of Patient Presentation:  69-year-old male with PMH HTN presenting after syncopal episode. Dropped to knees prior, no head injury. Arrived in c-collar. C spine cleared. Vitals normal. Episode of bradycardia in ED. Physical exam otherwise unremarkable. Slight hypokalemia. 1L bolus in ED. Has K+ pills at home. Holter monitor ordered for possible cardiogenic cause. Troponin and BNP negative. F/u with PCP in 2 days. F/u with cardiology next week. Discharged to home.     MDM     Amount and/or Complexity of Data Reviewed  Clinical lab tests: ordered and reviewed  Tests in the radiology section of CPT®: ordered and reviewed  Tests in the medicine section of CPT®: ordered and reviewed  Obtain history from someone other than the patient: yes  Independent visualization of images, tracings, or specimens: yes    Risk of Complications, Morbidity, and/or Mortality  Presenting problems: moderate  Diagnostic procedures: moderate  Management options: low    /   Vitals Reviewed:    Vitals:    01/31/23 1104 01/31/23 1138 01/31/23 1203 01/31/23 1337   BP: (!) 145/100 (!) 145/89  136/83   Pulse: 76 72 73 76   Resp: 17 18 17 18   Temp: 97.5 °F (36.4 °C)      TempSrc: Oral      SpO2: 93% 95% 96% 97%   Weight: 245 lb (111.1 kg)      Height: 6' 1\" (1.854 m)          The patient was seen and examined. Appropriate diagnostic testing was performed and results reviewed with the patient. The results of pertinent diagnostic studies and exam findings were discussed. The patients provisional diagnosis and plan of care were discussed with the patient and present family who expressed understanding. Any medications were reviewed and indications and risks of medications were discussed with the patient /family present. Strict verbal and written return precautions, instructions and appropriate follow-up provided to  the patient. ED Medications administered this visit:  (None if blank)  Medications   0.9 % sodium chloride bolus (0 mLs IntraVENous Stopped 1/31/23 1440)         PROCEDURES: (None if blank)  Procedures:       DISCHARGE PRESCRIPTIONS: (None if blank)  Discharge Medication List as of 1/31/2023  3:19 PM          FINAL IMPRESSION      1. Syncope, unspecified syncope type    2.  Bradycardia          DISPOSITION/PLAN   DISPOSITION Decision To Discharge 01/31/2023 02:58:51 PM      OUTPATIENT FOLLOW UP THE PATIENT:  Isa Lizama MD  [address] [address] [address] [place] [address],[address]  [phone]    Schedule an appointment as soon as possible for a visit   ED follow up    Teja Church MD  [address] [address]  [phone]    Go on 2/9/2023  ED follow up    Liza Hodgkin, MD Liza Hodgkin, MD  Resident  01/31/23 2759

## 2023-01-31 NOTE — ED NOTES
Pt reports he was at work today when he felt like he was \"in a dream\" working on his machine. States his head felt like \"vertigo\". Reports when he went to walk away he felt himself started to get more dizzy. Reports falling down onto his knee then falling forward onto his face. Pt states he did lose consciousness. Factory nurse was called who checked pt blood sugar reporting it to be 110. They gave him some orange juice and ems reports a glucose of 220 upon their arrival. It is reported they tried to sit patient [facility] at [facility] [place] [address] but laid him back down once they noticed he became very pale. Pt alert and oriented. Denies any pain. EMS placed pt in ccollar prior to arrival for precaution.  EKG completed upon arrival.     Brandin Snider RN  01/31/23 8470

## 2023-01-31 NOTE — ED PROVIDER NOTES
[facility]  EMERGENCY MEDICINE ATTENDING ATTESTATION      Evaluation of Ileana Rivera. Case discussed and care plan developed with resident physician. I agree with the resident physician documentation and plan as documented by him, except if my documentation differs. Patient seen, interviewed and examined by me. I reviewed the medical, surgical, family and social history, medications and allergies. I have reviewed the nursing documentation. Brief H&P   Patient c/o lightheadedness area while at work followed by 1 syncopal episode. Patient was asymptomatic at the moment he came to the emergency department but at some time during observation had 1 episode of bradycardia with lightheadedness, witnessed by the patient's nurse. Patient does go camping and hunting several times a year. Physical exam is notable for well appearing, nonfocal exam.  Not bradycardic at the moment of my evaluation but definitely evidence of bradycardia on our cardiac monitor. Medical Decision Making   MDM:   Syncope  Possibly from bradycardia  Plan:   IV line, labs  EKG   IV fluids  observation in the ED while awaiting results  Given the bradycardia coexisting with symptoms, will start Holter monitor and request cardiology follow-up    Please see the resident physician completed note for final disposition except as documented on this attestation. I have reviewed and interpreted all available lab, radiology and ekg results available at the moment. Diagnosis, treatment and disposition plans were discussed and agreed upon by patient. This transcription was electronically signed. It was dictated by use of voice recognition software and electronically transcribed. The transcription may contain errors not detected in proofreading.      I performed direct supervision and was present for the critical portion following procedures: None  Critical care time on this case: None    Electronically signed by Joi Green MD on 1/31/23 at 1:12 PM EST        Joi Green MD  01/31/23 5044

## 2023-01-31 NOTE — DISCHARGE INSTRUCTIONS
You were seen in the emergency department. Your labs were normal, other than a slightly decreased potassium. Please take your potassium pill as prescribed. While within the emergency department, there was an episode of bradycardia where your heart rate decreased down to 48 bpm recorded on the monitor. This is a possible cause of syncope, so you were given an order for a cardiac monitor that you will wear for 48 hours. This will record your heart rhythm. You were made an appointment with cardiology for Thursday, February 9th at 1PM with Dr. David Moses. Please follow up with your primary care provider in the interim. Please call [facility] at [phone] later this week to schedule an appointment to  your Holter monitor. PLEASE RETURN TO THE EMERGENCY DEPARTMENT IMMEDIATELY for worsening symptoms of increasing pain, shortness of breath, feeling of your heart fluttering or racing, swelling to your feet, unable to lay flat, or if you develop any concerning symptoms such as: high fever not relieved by acetaminophen (Tylenol) and/or ibuprofen (Motrin / Advil), chills, persistent nausea and/or vomiting, loss of consciousness, numbness, weakness or tingling in the arms or legs or change in color of the extremities, changes in mental status, persistent headache, blurry vision, loss of bladder / bowel control, unable to follow up with your physician, or other any other care or concern.

## 2023-02-02 ENCOUNTER — NURSE ONLY (OUTPATIENT)
Dept: LAB | Age: 48
End: 2023-02-02

## 2023-02-02 ENCOUNTER — TELEPHONE (OUTPATIENT)
Dept: FAMILY MEDICINE CLINIC | Age: 48
End: 2023-02-02

## 2023-02-02 ENCOUNTER — PATIENT MESSAGE (OUTPATIENT)
Dept: FAMILY MEDICINE CLINIC | Age: 48
End: 2023-02-02

## 2023-02-02 ENCOUNTER — OFFICE VISIT (OUTPATIENT)
Dept: FAMILY MEDICINE CLINIC | Age: 48
End: 2023-02-02
Payer: COMMERCIAL

## 2023-02-02 VITALS
HEART RATE: 76 BPM | OXYGEN SATURATION: 98 % | RESPIRATION RATE: 16 BRPM | SYSTOLIC BLOOD PRESSURE: 130 MMHG | WEIGHT: 258 LBS | BODY MASS INDEX: 34.19 KG/M2 | TEMPERATURE: 97.6 F | HEIGHT: 73 IN | DIASTOLIC BLOOD PRESSURE: 80 MMHG

## 2023-02-02 DIAGNOSIS — G47.33 OBSTRUCTIVE SLEEP APNEA ON CPAP: ICD-10-CM

## 2023-02-02 DIAGNOSIS — R55 SYNCOPE, UNSPECIFIED SYNCOPE TYPE: Primary | ICD-10-CM

## 2023-02-02 DIAGNOSIS — Z99.89 OBSTRUCTIVE SLEEP APNEA ON CPAP: ICD-10-CM

## 2023-02-02 DIAGNOSIS — I10 PRIMARY HYPERTENSION: ICD-10-CM

## 2023-02-02 DIAGNOSIS — R55 SYNCOPE, UNSPECIFIED SYNCOPE TYPE: ICD-10-CM

## 2023-02-02 DIAGNOSIS — Z12.11 SCREEN FOR COLON CANCER: ICD-10-CM

## 2023-02-02 LAB — TSH SERPL DL<=0.005 MIU/L-ACNC: 1.98 UIU/ML (ref 0.4–4.2)

## 2023-02-02 PROCEDURE — 3079F DIAST BP 80-89 MM HG: CPT | Performed by: NURSE PRACTITIONER

## 2023-02-02 PROCEDURE — 99204 OFFICE O/P NEW MOD 45 MIN: CPT | Performed by: NURSE PRACTITIONER

## 2023-02-02 PROCEDURE — 3075F SYST BP GE 130 - 139MM HG: CPT | Performed by: NURSE PRACTITIONER

## 2023-02-02 ASSESSMENT — PATIENT HEALTH QUESTIONNAIRE - PHQ9
SUM OF ALL RESPONSES TO PHQ QUESTIONS 1-9: 0
SUM OF ALL RESPONSES TO PHQ9 QUESTIONS 1 & 2: 0
2. FEELING DOWN, DEPRESSED OR HOPELESS: 0
SUM OF ALL RESPONSES TO PHQ QUESTIONS 1-9: 0
1. LITTLE INTEREST OR PLEASURE IN DOING THINGS: 0

## 2023-02-02 NOTE — PROGRESS NOTES
Chief Complaint:   Jaavn Hernandez is a 49 y.o. male who presents for complete physical examination    History of Present Illness:    Chief Complaint   Patient presents with    New Patient     Health Maintenance   Topic Date Due    COVID-19 Vaccine (1) Never done    DTaP/Tdap/Td vaccine (1 - Tdap) Never done    Colorectal Cancer Screen  Never done    Flu vaccine (1) Never done    Depression Screen  02/02/2024    Diabetes screen  05/07/2025    Lipids  09/18/2026    Hepatitis A vaccine  Aged Out    Hib vaccine  Aged Out    Meningococcal (ACWY) vaccine  Aged Out    Pneumococcal 0-64 years Vaccine  Aged Out    Hepatitis C screen  Discontinued    HIV screen  Discontinued       Pt here to establish. Had recent ER visit due to syncope  notes were reviewed. Pt reports it is possible he took a double dose of norvasc the morning of his syncope    denies any seizure activity     Patient Active Problem List   Diagnosis    Fatigue    Inadequate sleep hygiene    HTN (hypertension)    Obstructive sleep apnea on CPAP    Obesity (BMI 30-39. 9)     Past Medical History:   Diagnosis Date    Hypertension        Past Surgical History:   Procedure Laterality Date    APPENDECTOMY         Current Outpatient Medications   Medication Sig Dispense Refill    losartan-hydroCHLOROthiazide (HYZAAR) 50-12.5 MG per tablet Take 1 tablet by mouth daily      potassium chloride (MICRO-K) 10 MEQ extended release capsule Take 10 mEq by mouth 2 times daily      amLODIPine (NORVASC) 5 MG tablet Take 5 mg by mouth daily      lisinopril-hydrochlorothiazide (PRINZIDE;ZESTORETIC) 20-12.5 MG per tablet Take 1 tablet by mouth daily (Patient not taking: Reported on 2/2/2023)       No current facility-administered medications for this visit.      No Known Allergies    Social History     Socioeconomic History    Marital status:      Spouse name: None    Number of children: None    Years of education: None    Highest education level: None   Tobacco Use Smoking status: Never    Smokeless tobacco: Former     Types: Snuff     Quit date: 1/1/2010   Substance and Sexual Activity    Alcohol use: Yes     Alcohol/week: 0.0 standard drinks     Comment: socially    Drug use: No    Sexual activity: Yes     Partners: Female     No family history on file. Review Of Systems  Skin: no abnormal pigmentation, rash, scaling, itching, masses, hair or nail changes  Eyes: no blurring, diplopia, or eye pain  Ears/Nose/Throat: no hearing loss, tinnitus, vertigo, nosebleed, nasal congestion, rhinorrhea, sore throat  Respiratory: no cough, pleuritic chest pain, dyspnea, or wheezing  Cardiovascular: no angina, FOUNTAIN, orthopnea, PND, palpitations, or claudication  Gastrointestinal: no nausea, vomiting, heartburn, diarrhea, constipation, bloating, or abdominal pain  Genitourinary: no urinary urgency, frequency, dysuria, nocturia, hesitancy, or incontinence  Musculoskeletal: no arthritis, arthralgia, myalgia, weakness, or morning stiffness  Neurologic: no paralysis, paresis, paresthesia, seizures, tremors, or headaches  Hematologic/Lymphatic/Immunologic: no anemia, abnormal bleeding/bruising, fever, chills, night sweats, swollen glands, or unexplained weight loss  Endocrine: no heat or cold intolerance and no polyphagia, polydipsia, or polyuria    PHYSICAL EXAMINATION:  /80 (Site: Left Upper Arm, Position: Sitting, Cuff Size: Medium Adult)   Pulse 76   Temp 97.6 °F (36.4 °C) (Temporal)   Resp 16   Ht 6' 1\" (1.854 m)   Wt 258 lb (117 kg)   SpO2 98%   BMI 34.04 kg/m²   General appearance: healthy, alert, no distress  Skin: Skin color, texture, turgor normal. No rashes or lesions. No induration or tightening palpated. Head: Normocephalic. No masses, lesions, tenderness or abnormalities  Eyes: conjunctivae/corneas clear. PERRL, EOM's intact. Fundi are normal, no papilledema, hemorrhages or exudates. No AV crossing changes are noted.   Ears: External ears normal. Canals clear. TM's clear bilaterally. Hearing normal to finger rub. Nose/Sinuses: Nares normal. Septum midline. Mucosa normal. No drainage or sinus tenderness. Oropharynx: Lips, mucosa, and tongue normal. Teeth and gums normal. Oropharynx clear with no exudate seen. Neck: Neck supple, and symmetric. No adenopathy. Thyroid symmetric, normal size, without nodule. Trachea is midline. Back: Back symmetric, no curvature. ROM normal. No CVA tenderness. Lungs: Good diaphragmatic excursion. Lungs clear to auscultation bilaterally. No retractions or use of accessory muscles. No tactile fremitus. Normal chest percussion. Heart: PMI is not displaced, and no thrill noted. Regular rate and rhythm, with no rub, murmur or gallop noted. Abdomen: Abdomen soft, non-tender. BS normal. No masses, organomegaly. No hernia noted. Extremities: Extremities normal. No deformities, edema, or skin discoloration. No cyanosis or clubbing noted to the nails. Lymph: No lymphadenopathy of the neck or supraclavicular regions. Musculoskeletal: Spine ROM normal. Muscular strength intact. Peripheral pulses: radial=4/4, femoral=4/4, dorsalis pedis=4/4,  Neuro: Cranial nerves intact, Gait normal. Reflexes normal and symmetric, with no pathologic reflex noted. No focal weakness. Normal sensation to light touch. No components found for: CHLPL  No results found for: TRIG  Lab Results   Component Value Date    HDL 44 09/18/2021     Lab Results   Component Value Date    LDLCALC 123 09/18/2021     No results found for: LABVLDL  No results found for: PSA      ASSESSMENT:     Diagnosis Orders   1. Screen for colon cancer  [place] - Joslyn Cancino MD, Gastroenterology, [facility] [address] [address]      2. Syncope, unspecified syncope type  TSH With Reflex Ft4      3. Obstructive sleep apnea on CPAP        4. Primary hypertension              Plan:   See orders and medications filed with this encounter.   Advised on preventative health maintenance guidelines and schedules to be completed. reviewed appropriate vaccines. Pt refused none. I did add on a thyroid. Will refer to gi for colon cancer screen. Discussed likely cardiac work up  . Orders per enc. To call with any questions or concerns.

## 2023-02-02 NOTE — LETTER
[address] [place], [place] [address]  Phone: [phone]  Fax: [phone]    DANA Gates CNP        February 2, 2023      Timotyh Reyes may return to work on 02/03/2023 with no restrictions. Please contact our office with any questions.        Sincerely,        DANA Gates CNP

## 2023-02-02 NOTE — TELEPHONE ENCOUNTER
From: Carlin Bowling  To: Lauri Dillard  Sent: 2/2/2023 4:34 PM EST  Subject: Work return    Simone Carreon has a slip from the ER that says he can return to work tomorrow (2/3), but his employer would like a slip from you also saying he can return tomorrow. Any chance we could get a return to work to work slip?  Cece

## 2023-02-03 LAB — B BURGDOR IGM SER QL IB: NEGATIVE

## 2023-02-03 RX ORDER — POTASSIUM CHLORIDE 750 MG/1
10 CAPSULE, EXTENDED RELEASE ORAL 2 TIMES DAILY
Qty: 60 CAPSULE | Refills: 11 | Status: SHIPPED | OUTPATIENT
Start: 2023-02-03

## 2023-02-03 RX ORDER — LOSARTAN POTASSIUM AND HYDROCHLOROTHIAZIDE 12.5; 5 MG/1; MG/1
1 TABLET ORAL DAILY
Qty: 30 TABLET | Refills: 11 | Status: SHIPPED | OUTPATIENT
Start: 2023-02-03

## 2023-02-03 RX ORDER — AMLODIPINE BESYLATE 5 MG/1
5 TABLET ORAL DAILY
Qty: 30 TABLET | Refills: 11 | Status: SHIPPED | OUTPATIENT
Start: 2023-02-03

## 2023-02-06 ENCOUNTER — HOSPITAL ENCOUNTER (OUTPATIENT)
Dept: GENERAL RADIOLOGY | Age: 48
Discharge: HOME | End: 2023-02-06
Payer: COMMERCIAL

## 2023-02-06 PROCEDURE — 93225 XTRNL ECG REC<48 HRS REC: CPT

## 2023-02-06 PROCEDURE — 93226 XTRNL ECG REC<48 HR SCAN A/R: CPT

## 2023-02-14 LAB
ACQUISITION DURATION: NORMAL S
AVERAGE HEART RATE: 85 BPM
HOOKUP DATE: NORMAL
HOOKUP TIME: NORMAL
MAX HEART RATE TIME/DATE: NORMAL
MAX HEART RATE: 118 BPM
MIN HEART RATE TIME/DATE: NORMAL
MIN HEART RATE: 60 BPM
NUMBER OF QRS COMPLEXES: NORMAL
NUMBER OF SUPRAVENTRICULAR COUPLETS: 0
NUMBER OF SUPRAVENTRICULAR ECTOPICS: 3
NUMBER OF SUPRAVENTRICULAR ISOLATED BEATS: 3
NUMBER OF VENTRICULAR BIGEMINAL CYCLES: 0
NUMBER OF VENTRICULAR COUPLETS: 0
NUMBER OF VENTRICULAR ECTOPICS: 3

## 2023-02-16 ENCOUNTER — OFFICE VISIT (OUTPATIENT)
Dept: CARDIOLOGY CLINIC | Age: 48
End: 2023-02-16
Payer: COMMERCIAL

## 2023-02-16 VITALS
SYSTOLIC BLOOD PRESSURE: 124 MMHG | DIASTOLIC BLOOD PRESSURE: 68 MMHG | HEIGHT: 73 IN | HEART RATE: 80 BPM | BODY MASS INDEX: 34.59 KG/M2 | WEIGHT: 261 LBS

## 2023-02-16 DIAGNOSIS — R55 SYNCOPE AND COLLAPSE: ICD-10-CM

## 2023-02-16 DIAGNOSIS — I10 PRIMARY HYPERTENSION: Primary | ICD-10-CM

## 2023-02-16 DIAGNOSIS — R06.02 SOB (SHORTNESS OF BREATH): ICD-10-CM

## 2023-02-16 PROCEDURE — 99204 OFFICE O/P NEW MOD 45 MIN: CPT | Performed by: NUCLEAR MEDICINE

## 2023-02-16 PROCEDURE — 3078F DIAST BP <80 MM HG: CPT | Performed by: NUCLEAR MEDICINE

## 2023-02-16 PROCEDURE — 3074F SYST BP LT 130 MM HG: CPT | Performed by: NUCLEAR MEDICINE

## 2023-02-16 ASSESSMENT — ENCOUNTER SYMPTOMS
CONSTIPATION: 0
PHOTOPHOBIA: 0
RECTAL PAIN: 0
ANAL BLEEDING: 0
BLOOD IN STOOL: 0
COLOR CHANGE: 0
CHEST TIGHTNESS: 0
ABDOMINAL PAIN: 0
ABDOMINAL DISTENTION: 0
SHORTNESS OF BREATH: 1
VOMITING: 0
DIARRHEA: 0
NAUSEA: 0
BACK PAIN: 0

## 2023-02-16 NOTE — PROGRESS NOTES
[address] [address]  [place] [address]  Dept: [phone]  Dept Fax: [phone]  Loc: [phone]    Visit Date: 2/16/2023    Gray Haq is a 49 y.o. male who presents todayfor:  Chief Complaint   Patient presents with    New Patient    Hypertension    Dizziness     Here from the ER   Had syncope at work   Doesn't remember exactly what happened  [address] and then went down   This was a one episode   No obvious dizziness  No seizure disorder per se  Does have HTN and on medical Rx  Er work up was okay   Here for evaluation   No known CAD before  Some weight issues  Known sleep apnea on CPAP   No smoking   Family history of CAD     HPI:  Hypertension  Associated symptoms include shortness of breath. Pertinent negatives include no chest pain, headaches, neck pain or palpitations. Dizziness  Associated symptoms include fatigue. Pertinent negatives include no abdominal pain, arthralgias, chest pain, diaphoresis, headaches, joint swelling, myalgias, nausea, neck pain, rash or vomiting. Past Medical History:   Diagnosis Date    Hypertension       Past Surgical History:   Procedure Laterality Date    APPENDECTOMY       No family history on file. Social History     Tobacco Use    Smoking status: Never    Smokeless tobacco: Former     Types: Snuff     Quit date: 1/1/2010   Substance Use Topics    Alcohol use:  Yes     Alcohol/week: 0.0 standard drinks     Comment: socially      Current Outpatient Medications   Medication Sig Dispense Refill    amLODIPine (NORVASC) 5 MG tablet Take 1 tablet by mouth daily (Patient taking differently: Take 10 mg by mouth daily) 30 tablet 11    losartan-hydroCHLOROthiazide (HYZAAR) 50-12.5 MG per tablet Take 1 tablet by mouth daily 30 tablet 11    potassium chloride (MICRO-K) 10 MEQ extended release capsule Take 1 capsule by mouth 2 times daily (Patient taking differently: Take 10 mEq by mouth 3 times daily) 60 capsule 11     No current facility-administered medications for this visit. No Known Allergies  Health Maintenance   Topic Date Due    COVID-19 Vaccine (1) Never done    DTaP/Tdap/Td vaccine (1 - Tdap) Never done    Colorectal Cancer Screen  Never done    Flu vaccine (1) Never done    Depression Screen  02/02/2024    Diabetes screen  05/07/2025    Lipids  09/18/2026    Hepatitis A vaccine  Aged Out    Hib vaccine  Aged Out    Meningococcal (ACWY) vaccine  Aged Out    Pneumococcal 0-64 years Vaccine  Aged Out    Hepatitis C screen  Discontinued    HIV screen  Discontinued       Subjective:  Review of Systems   Constitutional:  Positive for fatigue. Negative for diaphoresis. HENT:  Negative for drooling and hearing loss. Eyes:  Negative for photophobia. Respiratory:  Positive for shortness of breath. Negative for chest tightness. Cardiovascular:  Negative for chest pain, palpitations and leg swelling. Gastrointestinal:  Negative for abdominal distention, abdominal pain, anal bleeding, blood in stool, constipation, diarrhea, nausea, rectal pain and vomiting. Endocrine: Negative for polyphagia. Genitourinary:  Negative for dysuria, frequency and urgency. Musculoskeletal:  Negative for arthralgias, back pain, gait problem, joint swelling, myalgias, neck pain and neck stiffness. Skin:  Negative for color change, pallor, rash and wound. Allergic/Immunologic: Negative for food allergies. Neurological:  Positive for dizziness and syncope. Negative for headaches. Psychiatric/Behavioral:  Negative for behavioral problems, confusion, decreased concentration and dysphoric mood. Objective:  Physical Exam  HENT:      Head: Normocephalic. Right Ear: Tympanic membrane normal.      Nose: Nose normal.      Mouth/Throat:      Mouth: Mucous membranes are moist.   Eyes:      Pupils: Pupils are equal, round, and reactive to light.    Cardiovascular:      Rate and Rhythm: Normal rate and regular rhythm. Heart sounds: Murmur heard. No gallop. Pulmonary:      Effort: No respiratory distress. Breath sounds: No stridor. No wheezing, rhonchi or rales. Chest:      Chest wall: No tenderness. Abdominal:      General: There is no distension. Palpations: There is no mass. Tenderness: There is no abdominal tenderness. There is no right CVA tenderness, left CVA tenderness, guarding or rebound. Hernia: No hernia is present. Musculoskeletal:         General: No swelling, tenderness, deformity or signs of injury. Cervical back: Normal range of motion. Right lower leg: No edema. Left lower leg: No edema. Skin:     Coloration: Skin is not jaundiced or pale. Findings: No bruising, erythema, lesion or rash. Neurological:      Mental Status: He is alert and oriented to person, place, and time. Cranial Nerves: No cranial nerve deficit. Sensory: No sensory deficit. Motor: No weakness. Coordination: Coordination normal.      Gait: Gait normal.      Deep Tendon Reflexes: Reflexes normal.   Psychiatric:         Mood and Affect: Mood normal.         Thought Content: Thought content normal.     /68   Pulse 80   Ht 6' 1\" (1.854 m)   Wt 261 lb (118.4 kg)   BMI 34.43 kg/m²     Assessment:      Diagnosis Orders   1. Primary hypertension        2. Syncope and collapse        As above  Syncope  No clear causes   ? ? Hypotension vs other causes  Some risk for CAD     Plan:  No follow-ups on file. As above  Tilt table   Echo   Event   Continue risk factor modification and medical management  Thank you for allowing me to participate in the care of your patient. Please don't hesitate to contact me regarding any further issues related to the patient care    Orders Placed:  No orders of the defined types were placed in this encounter. Medications Prescribed:  No orders of the defined types were placed in this encounter.          Discussed use, benefit, and side effects of prescribed medications. All patient questions answered. Pt voicedunderstanding. Instructed to continue current medications, diet and exercise. Continue risk factor modification and medical management. Patient agreed with treatment plan. Follow up as directed.     Electronically signedby Wes Cody MD on 2/16/2023 at 9:13 AM

## 2023-03-01 ENCOUNTER — HOSPITAL ENCOUNTER (OUTPATIENT)
Dept: NON INVASIVE DIAGNOSTICS | Age: 48
Discharge: HOME | End: 2023-03-01
Payer: COMMERCIAL

## 2023-03-01 DIAGNOSIS — I10 PRIMARY HYPERTENSION: ICD-10-CM

## 2023-03-01 DIAGNOSIS — R55 SYNCOPE AND COLLAPSE: ICD-10-CM

## 2023-03-01 DIAGNOSIS — R06.02 SOB (SHORTNESS OF BREATH): ICD-10-CM

## 2023-03-01 LAB
LV EF: 60 %
LVEF MODALITY: NORMAL

## 2023-03-01 PROCEDURE — 93306 TTE W/DOPPLER COMPLETE: CPT

## 2023-03-01 PROCEDURE — 93270 REMOTE 30 DAY ECG REV/REPORT: CPT

## 2023-03-01 NOTE — PROCEDURES
The skin was prepped and a 30 day cardiac event monitor was applied. The patient was instructed on the documentation of symptoms and the purpose of the monitor as well as the things to avoid while wearing the monitor. The patient was instructed to remove and return the monitor on 03/31/2023.   The serial number of the monitor that was applied is [id number]

## 2023-03-22 ENCOUNTER — TELEPHONE (OUTPATIENT)
Dept: CARDIOLOGY CLINIC | Age: 48
End: 2023-03-22

## 2023-03-22 NOTE — TELEPHONE ENCOUNTER
Pt called and stated that he is currently wearing the 30 day event monitor, starting on 3/1/23. Pt said he is going out of state with his son and would like to end the event monitor early. Pt said they would be doing lots of activities and did not want to take the monitor/phone with him. Is he okay to stop early?

## 2023-06-01 ENCOUNTER — OFFICE VISIT (OUTPATIENT)
Dept: CARDIOLOGY CLINIC | Age: 48
End: 2023-06-01
Payer: COMMERCIAL

## 2023-06-01 VITALS
WEIGHT: 262 LBS | SYSTOLIC BLOOD PRESSURE: 146 MMHG | BODY MASS INDEX: 34.72 KG/M2 | DIASTOLIC BLOOD PRESSURE: 98 MMHG | HEART RATE: 73 BPM | HEIGHT: 73 IN

## 2023-06-01 DIAGNOSIS — I10 PRIMARY HYPERTENSION: Primary | ICD-10-CM

## 2023-06-01 PROCEDURE — 3080F DIAST BP >= 90 MM HG: CPT | Performed by: NUCLEAR MEDICINE

## 2023-06-01 PROCEDURE — 99213 OFFICE O/P EST LOW 20 MIN: CPT | Performed by: NUCLEAR MEDICINE

## 2023-06-01 PROCEDURE — 3077F SYST BP >= 140 MM HG: CPT | Performed by: NUCLEAR MEDICINE

## 2023-06-01 RX ORDER — AMLODIPINE BESYLATE 10 MG/1
10 TABLET ORAL DAILY
Qty: 90 TABLET | Refills: 3 | Status: SHIPPED | OUTPATIENT
Start: 2023-06-01

## 2023-06-01 NOTE — PROGRESS NOTES
[address] [address]illa [address] [place] [address]  Dept: [phone]  Dept Fax: [phone]  Loc: [phone]    Visit Date: 6/1/2023    Gila Mcgregor is a 49 y.o. male who presents todayfor:  Chief Complaint   Patient presents with    3 Month Follow-Up    Hypertension     Had evaluation for syncope  No findings  Done well  No more issues  No chest pain   No changes in breathing  Bp is stable  No angina      HPI:  HPI  Past Medical History:   Diagnosis Date    Hypertension       Past Surgical History:   Procedure Laterality Date    APPENDECTOMY       No family history on file. Social History     Tobacco Use    Smoking status: Never    Smokeless tobacco: Former     Types: Snuff     Quit date: 1/1/2010   Substance Use Topics    Alcohol use: Yes     Alcohol/week: 0.0 standard drinks     Comment: socially      Current Outpatient Medications   Medication Sig Dispense Refill    amLODIPine (NORVASC) 5 MG tablet Take 1 tablet by mouth daily 30 tablet 11    losartan-hydroCHLOROthiazide (HYZAAR) 50-12.5 MG per tablet Take 1 tablet by mouth daily 30 tablet 11    potassium chloride (MICRO-K) 10 MEQ extended release capsule Take 1 capsule by mouth 2 times daily (Patient taking differently: Take 1 capsule by mouth 3 times daily) 60 capsule 11     No current facility-administered medications for this visit.      No Known Allergies  Health Maintenance   Topic Date Due    COVID-19 Vaccine (1) Never done    DTaP/Tdap/Td vaccine (1 - Tdap) Never done    Colorectal Cancer Screen  Never done    Flu vaccine (Season Ended) 08/01/2023    Depression Screen  02/02/2024    Diabetes screen  05/07/2025    Lipids  09/18/2026    Hepatitis A vaccine  Aged Out    Hib vaccine  Aged Out    Meningococcal (ACWY) vaccine  Aged Out    Pneumococcal 0-64 years Vaccine  Aged Out    Hepatitis C screen  Discontinued    HIV screen  Discontinued       Subjective:  General:   No fever,

## 2023-07-24 ENCOUNTER — OFFICE VISIT (OUTPATIENT)
Dept: PULMONOLOGY | Age: 48
End: 2023-07-24
Payer: COMMERCIAL

## 2023-07-24 VITALS
HEART RATE: 74 BPM | OXYGEN SATURATION: 98 % | BODY MASS INDEX: 35.78 KG/M2 | SYSTOLIC BLOOD PRESSURE: 144 MMHG | TEMPERATURE: 98.8 F | DIASTOLIC BLOOD PRESSURE: 90 MMHG | HEIGHT: 73 IN | WEIGHT: 270 LBS

## 2023-07-24 DIAGNOSIS — R09.81 NASAL CONGESTION: ICD-10-CM

## 2023-07-24 DIAGNOSIS — E66.9 OBESITY (BMI 30-39.9): ICD-10-CM

## 2023-07-24 DIAGNOSIS — G47.33 OBSTRUCTIVE SLEEP APNEA ON CPAP: Primary | ICD-10-CM

## 2023-07-24 DIAGNOSIS — Z99.89 OBSTRUCTIVE SLEEP APNEA ON CPAP: Primary | ICD-10-CM

## 2023-07-24 PROCEDURE — 3080F DIAST BP >= 90 MM HG: CPT | Performed by: PHYSICIAN ASSISTANT

## 2023-07-24 PROCEDURE — 3077F SYST BP >= 140 MM HG: CPT | Performed by: PHYSICIAN ASSISTANT

## 2023-07-24 PROCEDURE — 99214 OFFICE O/P EST MOD 30 MIN: CPT | Performed by: PHYSICIAN ASSISTANT

## 2023-07-24 ASSESSMENT — ENCOUNTER SYMPTOMS
ALLERGIC/IMMUNOLOGIC NEGATIVE: 1
BACK PAIN: 0
STRIDOR: 0
COUGH: 0
NAUSEA: 0
WHEEZING: 0
SHORTNESS OF BREATH: 0
EYES NEGATIVE: 1
CHEST TIGHTNESS: 0
DIARRHEA: 0

## 2023-07-24 NOTE — PROGRESS NOTES
[facility]      Rosario Sanchez         [id number]  7/24/2023   Chief Complaint   Patient presents with    Follow-up     1 year altagracia        Pt of Dr. Brandt Chopra     PAP Download:   Mina Von or initial AHI: 16.1     Date of initial study: 11/12/2015       Compliant  97%            Noncompliant 0 %     PAP Type Cpap   Level  8 cmH2O    Avg Hrs/Day 6.5  AHI: 1.7   Recorded compliance dates 563115-682255  Machine/Mfg:   [x] ResMed    [] Respironics/Dreamstation   Interface:   [] Nasal    [x] Nasal pillows   [] FFM      Provider:      [x] [facility]     []Ermelinda     [] Veronica    [] Lonnie Ku    [] Zoila               [] [facility]      [] Adaptive    [] [facility] [address]:      [] Other    Neck Size: 19  Mallampati 4  ESS:  4  SAQLI: 95    Here is a scan of the most recent download:            Presentation:   Noel Schofield presents for sleep medicine follow up for obstructive sleep apnea  Since the last visit, Neol Schofield is doing well with PAP. He is sleeping well and feels rested. He is having nasal congestion. He is using Flonase. Equipment issues: The pressure is  acceptable, the mask is acceptable     Sleep issues:  Do you feel better? Yes  More rested? Yes   Better concentration? yes    Progress History:   Since last visit any new medical issues? No  New ER or hospital visits? No  Any new or changes in medicines? No  Any new sleep medicines? No    Review of Systems -   Review of Systems   Constitutional:  Negative for activity change, appetite change, chills and fever. HENT:  Positive for congestion. Negative for postnasal drip. Eyes: Negative. Respiratory:  Negative for cough, chest tightness, shortness of breath, wheezing and stridor. Cardiovascular:  Negative for chest pain and leg swelling. Gastrointestinal:  Negative for diarrhea and nausea. Endocrine: Negative. Genitourinary: Negative. Musculoskeletal: Negative. Negative for arthralgias and back pain.

## 2023-10-17 ENCOUNTER — OFFICE VISIT (OUTPATIENT)
Dept: ENT CLINIC | Age: 48
End: 2023-10-17
Payer: COMMERCIAL

## 2023-10-17 VITALS
HEIGHT: 74 IN | SYSTOLIC BLOOD PRESSURE: 136 MMHG | WEIGHT: 264.7 LBS | DIASTOLIC BLOOD PRESSURE: 82 MMHG | HEART RATE: 56 BPM | TEMPERATURE: 98.3 F | OXYGEN SATURATION: 97 % | RESPIRATION RATE: 20 BRPM | BODY MASS INDEX: 33.97 KG/M2

## 2023-10-17 DIAGNOSIS — J34.3 HYPERTROPHY OF NASAL TURBINATES: ICD-10-CM

## 2023-10-17 DIAGNOSIS — G47.33 OSA ON CPAP: ICD-10-CM

## 2023-10-17 DIAGNOSIS — R09.81 NASAL CONGESTION: Primary | ICD-10-CM

## 2023-10-17 PROCEDURE — 3078F DIAST BP <80 MM HG: CPT | Performed by: NURSE PRACTITIONER

## 2023-10-17 PROCEDURE — 3074F SYST BP LT 130 MM HG: CPT | Performed by: NURSE PRACTITIONER

## 2023-10-17 PROCEDURE — 99203 OFFICE O/P NEW LOW 30 MIN: CPT | Performed by: NURSE PRACTITIONER
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
## 2023-10-21 ENCOUNTER — HOSPITAL ENCOUNTER (OUTPATIENT)
Dept: CT IMAGING | Age: 48
Discharge: HOME | End: 2023-10-21
Payer: COMMERCIAL

## 2023-10-21 DIAGNOSIS — R09.81 NASAL CONGESTION: ICD-10-CM

## 2023-10-21 DIAGNOSIS — J34.3 HYPERTROPHY OF NASAL TURBINATES: ICD-10-CM

## 2023-10-21 PROCEDURE — 70486 CT MAXILLOFACIAL W/O DYE: CPT

## 2024-01-29 ENCOUNTER — TELEPHONE (OUTPATIENT)
Dept: ENT CLINIC | Age: 49
End: 2024-01-29

## 2024-01-29 ENCOUNTER — OFFICE VISIT (OUTPATIENT)
Dept: ENT CLINIC | Age: 49
End: 2024-01-29
Payer: COMMERCIAL

## 2024-01-29 VITALS
SYSTOLIC BLOOD PRESSURE: 138 MMHG | HEIGHT: 74 IN | OXYGEN SATURATION: 95 % | WEIGHT: 267 LBS | DIASTOLIC BLOOD PRESSURE: 70 MMHG | HEART RATE: 88 BPM | RESPIRATION RATE: 18 BRPM | BODY MASS INDEX: 34.27 KG/M2 | TEMPERATURE: 97.2 F

## 2024-01-29 DIAGNOSIS — R09.81 NASAL CONGESTION: ICD-10-CM

## 2024-01-29 DIAGNOSIS — Z78.9 INTOLERANCE OF CONTINUOUS POSITIVE AIRWAY PRESSURE (CPAP) VENTILATION: ICD-10-CM

## 2024-01-29 DIAGNOSIS — J34.2 NASAL SEPTAL DEVIATION: ICD-10-CM

## 2024-01-29 DIAGNOSIS — J34.89 NASAL VALVE BLOCKAGE: ICD-10-CM

## 2024-01-29 DIAGNOSIS — J34.3 HYPERTROPHY OF NASAL TURBINATES: ICD-10-CM

## 2024-01-29 DIAGNOSIS — Z01.818 PRE-OP TESTING: Primary | ICD-10-CM

## 2024-01-29 DIAGNOSIS — G47.33 OSA ON CPAP: Primary | ICD-10-CM

## 2024-01-29 PROCEDURE — 99214 OFFICE O/P EST MOD 30 MIN: CPT | Performed by: OTOLARYNGOLOGY

## 2024-01-29 PROCEDURE — 3078F DIAST BP <80 MM HG: CPT | Performed by: OTOLARYNGOLOGY

## 2024-01-29 PROCEDURE — 3075F SYST BP GE 130 - 139MM HG: CPT | Performed by: OTOLARYNGOLOGY
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
## 2024-01-29 NOTE — TELEPHONE ENCOUNTER
Patient was asking about you ordering an ultrasound of thyroid.  I did not see an order.  Please advise

## 2024-01-30 PROBLEM — J34.3 HYPERTROPHY OF NASAL TURBINATES: Status: ACTIVE | Noted: 2024-01-30

## 2024-01-30 PROBLEM — J34.89 NASAL VALVE BLOCKAGE: Status: ACTIVE | Noted: 2024-01-30

## 2024-01-30 PROBLEM — G47.33 OSA ON CPAP: Status: ACTIVE | Noted: 2024-01-30

## 2024-01-30 PROBLEM — Z78.9 INTOLERANCE OF CONTINUOUS POSITIVE AIRWAY PRESSURE (CPAP) VENTILATION: Status: ACTIVE | Noted: 2024-01-30

## 2024-01-30 PROBLEM — R09.81 NASAL CONGESTION: Status: ACTIVE | Noted: 2024-01-30

## 2024-01-30 PROBLEM — J34.2 NASAL SEPTAL DEVIATION: Status: ACTIVE | Noted: 2024-01-30

## 2024-02-21 NOTE — TELEPHONE ENCOUNTER
Spoke to Dr Sams. He reviewed the patient's chart and said he could not recall discussing thyroid issues with the patient. Dr Sams requested I call and ask patient what was discussed regarding his thyroid.     Called and spoke to patient. He said when Dr Sams did his physical and felt his thyroid and neck it was mentioned his thyroid felt enlarged. He said that is when the topic of having a Thyroid US done was talked about. Dr Sams said it would just to get a baseline and evaluate things. Patient said he is not having any swallowing issues, pain or any other concerns. I informed patient Dr Sams is currently out of the country for 2 weeks but I would discuss it with him when he returns and give the patient a call to update.  Patient verbalized understanding and thanked me.

## 2024-02-26 RX ORDER — LOSARTAN POTASSIUM AND HYDROCHLOROTHIAZIDE 12.5; 5 MG/1; MG/1
1 TABLET ORAL DAILY
Qty: 90 TABLET | Refills: 1 | Status: SHIPPED | OUTPATIENT
Start: 2024-02-26

## 2024-03-01 ENCOUNTER — TELEPHONE (OUTPATIENT)
Dept: ENT CLINIC | Age: 49
End: 2024-03-01

## 2024-03-01 NOTE — TELEPHONE ENCOUNTER
Patient is having US Thyroid completed at [facility] today (3/1/2024), will contact them next week to get this sent to us.

## 2024-03-04 ENCOUNTER — OFFICE VISIT (OUTPATIENT)
Dept: FAMILY MEDICINE CLINIC | Age: 49
End: 2024-03-04
Payer: COMMERCIAL

## 2024-03-04 VITALS
HEIGHT: 74 IN | RESPIRATION RATE: 16 BRPM | HEART RATE: 74 BPM | WEIGHT: 263 LBS | DIASTOLIC BLOOD PRESSURE: 74 MMHG | OXYGEN SATURATION: 96 % | TEMPERATURE: 98.5 F | SYSTOLIC BLOOD PRESSURE: 128 MMHG | BODY MASS INDEX: 33.75 KG/M2

## 2024-03-04 DIAGNOSIS — Z00.00 ROUTINE PHYSICAL EXAMINATION: Primary | ICD-10-CM

## 2024-03-04 PROCEDURE — 3074F SYST BP LT 130 MM HG: CPT | Performed by: NURSE PRACTITIONER

## 2024-03-04 PROCEDURE — 90471 IMMUNIZATION ADMIN: CPT | Performed by: NURSE PRACTITIONER

## 2024-03-04 PROCEDURE — 3078F DIAST BP <80 MM HG: CPT | Performed by: NURSE PRACTITIONER

## 2024-03-04 PROCEDURE — 90715 TDAP VACCINE 7 YRS/> IM: CPT | Performed by: NURSE PRACTITIONER

## 2024-03-04 PROCEDURE — 99396 PREV VISIT EST AGE 40-64: CPT | Performed by: NURSE PRACTITIONER

## 2024-03-04 SDOH — ECONOMIC STABILITY: HOUSING INSECURITY
IN THE LAST 12 MONTHS, WAS THERE A TIME WHEN YOU DID NOT HAVE A STEADY PLACE TO SLEEP OR SLEPT IN A SHELTER (INCLUDING NOW)?: NO

## 2024-03-04 SDOH — ECONOMIC STABILITY: FOOD INSECURITY: WITHIN THE PAST 12 MONTHS, YOU WORRIED THAT YOUR FOOD WOULD RUN OUT BEFORE YOU GOT MONEY TO BUY MORE.: NEVER TRUE

## 2024-03-04 SDOH — ECONOMIC STABILITY: FOOD INSECURITY: WITHIN THE PAST 12 MONTHS, THE FOOD YOU BOUGHT JUST DIDN'T LAST AND YOU DIDN'T HAVE MONEY TO GET MORE.: NEVER TRUE

## 2024-03-04 SDOH — ECONOMIC STABILITY: INCOME INSECURITY: HOW HARD IS IT FOR YOU TO PAY FOR THE VERY BASICS LIKE FOOD, HOUSING, MEDICAL CARE, AND HEATING?: NOT HARD AT ALL

## 2024-03-04 ASSESSMENT — PATIENT HEALTH QUESTIONNAIRE - PHQ9
1. LITTLE INTEREST OR PLEASURE IN DOING THINGS: SEVERAL DAYS
2. FEELING DOWN, DEPRESSED OR HOPELESS: 1
SUM OF ALL RESPONSES TO PHQ9 QUESTIONS 1 & 2: 2
SUM OF ALL RESPONSES TO PHQ QUESTIONS 1-9: 2
2. FEELING DOWN, DEPRESSED OR HOPELESS: SEVERAL DAYS
SUM OF ALL RESPONSES TO PHQ9 QUESTIONS 1 & 2: 2
1. LITTLE INTEREST OR PLEASURE IN DOING THINGS: 1
SUM OF ALL RESPONSES TO PHQ QUESTIONS 1-9: 2

## 2024-03-04 NOTE — PROGRESS NOTES
Chief Complaint:   Carlin Bowling is a 48 y.o. male who presents for complete physical examination    History of Present Illness:    Chief Complaint   Patient presents with    Annual Exam     Health Maintenance   Topic Date Due    Hepatitis B vaccine (1 of 3 - 3-dose series) Never done    COVID-19 Vaccine (1) Never done    Flu vaccine (1) Never done    Depression Screen  03/04/2025    Diabetes screen  05/07/2025    Lipids  09/18/2026    Colorectal Cancer Screen  10/16/2033    DTaP/Tdap/Td vaccine (2 - Td or Tdap) 03/04/2034    Hepatitis A vaccine  Aged Out    Hib vaccine  Aged Out    Polio vaccine  Aged Out    Meningococcal (ACWY) vaccine  Aged Out    Pneumococcal 0-64 years Vaccine  Aged Out    Hepatitis C screen  Discontinued    HIV screen  Discontinued     Pt here for annual exam.  [facility].  Sugar was borderline and focusing on weight loss.  Pt is also planning sinus surgery to help HERB and chronic rhinitis        Patient Active Problem List   Diagnosis    Fatigue    Inadequate sleep hygiene    HTN (hypertension)    Obstructive sleep apnea on CPAP    Obesity (BMI 30-39.9)    Nasal congestion    Hypertrophy of nasal turbinates    HERB on CPAP    Intolerance of continuous positive airway pressure (CPAP) ventilation    Nasal septal deviation    Nasal valve blockage     Past Medical History:   Diagnosis Date    Allergic rhinitis     Anxiety     Depression     Hypertension     Obesity     Sleep apnea        Past Surgical History:   Procedure Laterality Date    APPENDECTOMY      CHOLECYSTECTOMY  10/17/2023       Current Outpatient Medications   Medication Sig Dispense Refill    losartan-hydroCHLOROthiazide (HYZAAR) 50-12.5 MG per tablet take 1 tablet by mouth once daily 90 tablet 1    Potassium Chloride 10 MEQ PACK Take by mouth      amLODIPine (NORVASC) 10 MG tablet Take 1 tablet by mouth daily 90 tablet 3     No current facility-administered medications for this visit.     No

## 2024-03-04 NOTE — PROGRESS NOTES
Immunizations Administered       Name Date Dose Route    TDaP, ADACEL (age 10y-64y), BOOSTRIX (age 10y+), IM, 0.5mL 3/4/2024 0.5 mL Intramuscular    Site: Deltoid- Left    Lot: [id number]    NDC: 36340-176-12           Patient tolerated vaccination

## 2024-03-05 ENCOUNTER — HOSPITAL ENCOUNTER (OUTPATIENT)
Dept: ULTRASOUND IMAGING | Age: 49
Discharge: HOME | End: 2024-03-05
Attending: RADIOLOGY

## 2024-03-05 DIAGNOSIS — Z00.6 EXAMINATION FOR NORMAL COMPARISON FOR CLINICAL RESEARCH: ICD-10-CM

## 2024-03-21 ENCOUNTER — TELEPHONE (OUTPATIENT)
Dept: CARDIOLOGY CLINIC | Age: 49
End: 2024-03-21

## 2024-03-21 NOTE — TELEPHONE ENCOUNTER
Carlin is scheduled for surgery with Dr Sams on 5/10/24. We are requesting cardiac clearance.    Surgery: DISE, Septoplasty, Bilateral Inferior Turbinate reduction, Nasal valve repair    Please advise.    Thank you!

## 2024-04-22 ENCOUNTER — HOSPITAL ENCOUNTER (OUTPATIENT)
Dept: GENERAL RADIOLOGY | Age: 49
Discharge: HOME | End: 2024-04-22
Payer: COMMERCIAL

## 2024-04-22 ENCOUNTER — HOSPITAL ENCOUNTER (OUTPATIENT)
Age: 49
Discharge: HOME | End: 2024-04-22
Payer: COMMERCIAL

## 2024-04-22 DIAGNOSIS — R09.81 NASAL CONGESTION: ICD-10-CM

## 2024-04-22 DIAGNOSIS — Z01.818 PRE-OP TESTING: ICD-10-CM

## 2024-04-22 LAB
ALBUMIN SERPL BCG-MCNC: 4.8 G/DL (ref 3.5–5.1)
ALP SERPL-CCNC: 72 U/L (ref 38–126)
ALT SERPL W/O P-5'-P-CCNC: 37 U/L (ref 11–66)
ANION GAP SERPL CALC-SCNC: 15 MEQ/L (ref 8–16)
AST SERPL-CCNC: 22 U/L (ref 5–40)
BASOPHILS ABSOLUTE: 0 THOU/MM3 (ref 0–0.1)
BASOPHILS NFR BLD AUTO: 0.7 %
BILIRUB SERPL-MCNC: 0.6 MG/DL (ref 0.3–1.2)
BUN SERPL-MCNC: 13 MG/DL (ref 7–22)
CALCIUM SERPL-MCNC: 9.4 MG/DL (ref 8.5–10.5)
CHLORIDE SERPL-SCNC: 102 MEQ/L (ref 98–111)
CO2 SERPL-SCNC: 27 MEQ/L (ref 23–33)
CREAT SERPL-MCNC: 0.8 MG/DL (ref 0.4–1.2)
DEPRECATED RDW RBC AUTO: 40.6 FL (ref 35–45)
EOSINOPHIL NFR BLD AUTO: 4.1 %
EOSINOPHILS ABSOLUTE: 0.2 THOU/MM3 (ref 0–0.4)
ERYTHROCYTE [DISTWIDTH] IN BLOOD BY AUTOMATED COUNT: 13 % (ref 11.5–14.5)
GFR SERPL CREATININE-BSD FRML MDRD: > 90 ML/MIN/1.73M2
GLUCOSE SERPL-MCNC: 96 MG/DL (ref 70–108)
HCT VFR BLD AUTO: 39.9 % (ref 42–52)
HGB BLD-MCNC: 13.4 GM/DL (ref 14–18)
IMM GRANULOCYTES # BLD AUTO: 0.02 THOU/MM3 (ref 0–0.07)
IMM GRANULOCYTES NFR BLD AUTO: 0.5 %
LYMPHOCYTES ABSOLUTE: 1.4 THOU/MM3 (ref 1–4.8)
LYMPHOCYTES NFR BLD AUTO: 33 %
MCH RBC QN AUTO: 29 PG (ref 26–33)
MCHC RBC AUTO-ENTMCNC: 33.6 GM/DL (ref 32.2–35.5)
MCV RBC AUTO: 86.4 FL (ref 80–94)
MONOCYTES ABSOLUTE: 0.4 THOU/MM3 (ref 0.4–1.3)
MONOCYTES NFR BLD AUTO: 9.4 %
NEUTROPHILS NFR BLD AUTO: 52.3 %
NRBC BLD AUTO-RTO: 0 /100 WBC
PLATELET # BLD AUTO: 247 THOU/MM3 (ref 130–400)
PMV BLD AUTO: 10.1 FL (ref 9.4–12.4)
POTASSIUM SERPL-SCNC: 3.4 MEQ/L (ref 3.5–5.2)
PROT SERPL-MCNC: 7.2 G/DL (ref 6.1–8)
RBC # BLD AUTO: 4.62 MILL/MM3 (ref 4.7–6.1)
SEGMENTED NEUTROPHILS ABSOLUTE COUNT: 2.2 THOU/MM3 (ref 1.8–7.7)
SODIUM SERPL-SCNC: 144 MEQ/L (ref 135–145)
WBC # BLD AUTO: 4.2 THOU/MM3 (ref 4.8–10.8)

## 2024-04-22 PROCEDURE — 80053 COMPREHEN METABOLIC PANEL: CPT

## 2024-04-22 PROCEDURE — 71046 X-RAY EXAM CHEST 2 VIEWS: CPT

## 2024-04-22 PROCEDURE — 85025 COMPLETE CBC W/AUTO DIFF WBC: CPT

## 2024-04-22 PROCEDURE — 36415 COLL VENOUS BLD VENIPUNCTURE: CPT

## 2024-04-22 PROCEDURE — 93005 ELECTROCARDIOGRAM TRACING: CPT | Performed by: OTOLARYNGOLOGY

## 2024-04-22 PROCEDURE — 93010 ELECTROCARDIOGRAM REPORT: CPT | Performed by: NUCLEAR MEDICINE

## 2024-04-24 LAB
EKG ATRIAL RATE: 66 BPM
EKG P AXIS: 47 DEGREES
EKG P-R INTERVAL: 172 MS
EKG Q-T INTERVAL: 418 MS
EKG QRS DURATION: 104 MS
EKG QTC CALCULATION (BAZETT): 438 MS
EKG R AXIS: 49 DEGREES
EKG T AXIS: 40 DEGREES
EKG VENTRICULAR RATE: 66 BPM

## 2024-04-28 ENCOUNTER — PATIENT MESSAGE (OUTPATIENT)
Dept: ENT CLINIC | Age: 49
End: 2024-04-28

## 2024-04-29 NOTE — TELEPHONE ENCOUNTER
From: Carlin Bowling  To: Dr. Sandro Sams  Sent: 4/28/2024 2:05 PM EDT  Subject: Pre op testing     Is it safe to assume all of the pre op testing came back ok and that surgery is a go for May 10?  Thanks!

## 2024-04-29 NOTE — TELEPHONE ENCOUNTER
Marques Gillis; on review of your pre-operative testing your chest Xray looks perfect. CBC had some several abnormal findings. No evidence of infection or low hemoglobin (increased risk of bleeding) noted. CMP noted slightly low potassium (3.4) that I would recommend reaching out to PCP about. Otherwise from what I can see; good to go! I will send this to surgery scheduler as well.  Thank you and have a great day!

## 2024-04-29 NOTE — TELEPHONE ENCOUNTER
Marques Gillis! Yes your surgery is still on for Friday May 10. I will be calling you the day prior to confirm your surgery time and arrival time.    Please follow the instruction sheet you were given to make sure you stay away from certain medications/vitamins the week prior.     If you have any questions, please call me or message through Abril.    Thank you!

## 2024-05-02 NOTE — PROGRESS NOTES
PAT call attempted, patient unavailable, left message to please call us back at your earliest convenience; [phone]

## 2024-05-02 NOTE — PROGRESS NOTES
PAT Call Date: 5/3  Surgery Date: 5/10    Surgeon: Pan   Surgery: septoplasty+    Any Isolation Precautions? No      Type of Isolation Precaution: Not Applicable   Is patient from a nursing home? No  Name of Nursing Home:   Any equipment assist needed for moving patient? No   Type of Equipment: Not Applicable  Patient last weight: 263 lb     Hard Copy on Chart  In EPIC Pending/Notes   Consent -   Within 30 days; signed, dated & timed by patient and physician     [] On Arrival     [] Blood      [] DNR   H&P -   Within 30 days    [] Physician To Do     Clearance -        3/25  []Medical     [x]Cardiac Baki-low risk     [] Pulmonary    Orders -   Signed and Dated      [] Physician To Do    Labs -   Within 3 months   4/22  [x] CBC -ok   [x] CMP-ok   [x] GFR-ok   [] INR    [] PTT    [] Urine    [] Liver Enzymes    [] MRSA Nasal      Others:     Radiology Studies -   Within 1 year  4/22    10/21/23    3/2    [x] Chest X-Ray-ok   [] MRI    [x] CT facial   [] Vascular   [x] US thyroid     Pulmonary -     [x] HERB   [] CPAP     Cardiac Workup -   Stress Test, Echo, Cath within 18 months  4/22      3/1    3/1  [x] EKG-ok      [] Cath                 [] Stress Test                      [x] Echo/MISSY 60%   [] CABG   [x] Holter Monitor      [] Pacemaker/ICD        Brand:        Where does patient have checked:         Last check:         Rep Notified:

## 2024-05-03 ENCOUNTER — PREP FOR PROCEDURE (OUTPATIENT)
Dept: ENT CLINIC | Age: 49
End: 2024-05-03

## 2024-05-03 NOTE — PROGRESS NOTES
Follow all instructions given by your physician  Do not eat or drink anything after midnight prior to surgery(includes water, chewing gum, mints and ice chips)  Sips of water am of surgery with allowed medications  Do not use chewing tobacco after midnight prior to surgery  May brush teeth do not swallow water  Do not smoke, drink alcoholic beverages or use any illicit drugs for 24 hours prior to surgery  Bring insurance info and photo ID  Bring pertinent paperwork with you from Doctor or surgeons's office  Wear clean comfortable, loose-fitting clothing  No make-up, nail polish, jewelry, piercings, or contact lenses to be worn day of surgery  No glue on dentures morning of surgery; you will be asked to remove them for surgery. Case for glasses.  Shower the night before and the morning of surgery with cleansing soap provided or a liquid antibacterial soap, dry with new fresh clean towel after each shower, no lotions, creams or powder.  Clean sheets and pillowcase on bed night before surgery  Bring medications in original bottles, Bring rescue inhalers with you  Bring CPAP/BIPAP machine if you have one ( you may be charged if one is needed in recovery room )    Our pharmacy has a Meds to Beds program where they will deliver any new prescriptions you may have to your room before you leave. Our Pharmacy will clear it through your insurance; for example (same co pay). This enables you to take your new RX as soon as you need when you get home and avoids stop/wait delays on the way home.  Please have a form of payment with you and have someone designated as your Pharmacy contact with their phone # as you may not feel well or still be under the influence of anesthesia.    Please refer to the SSI-Surgical Site Infection Flyer you hopefully received in the mail-together we can prevent infections; signs and symptoms reviewed.  When discharged be sure you understand how to care for your wound and that you have the Dr./office  phone # to call if you have any concerns or questions about your wound.     needed at discharge and someone over 18 to stay with you for 24 hours overnight (surgery may be cancelled if you don't have this)  Report to [facility] on 2nd floor  If you would become ill prior to surgery, please call the surgeon  May have a visitor with you, we request that you limit to 2 visitors in pre-op area  Masks are recommended but not required, new masks at entrance desk  Call [phone] for any questions

## 2024-05-09 RX ORDER — IPRATROPIUM BROMIDE AND ALBUTEROL SULFATE 2.5; .5 MG/3ML; MG/3ML
1 SOLUTION RESPIRATORY (INHALATION) EVERY 4 HOURS PRN
Status: CANCELLED | OUTPATIENT
Start: 2024-05-10

## 2024-05-09 RX ORDER — SODIUM CHLORIDE 0.9 % (FLUSH) 0.9 %
5-40 SYRINGE (ML) INJECTION PRN
Status: CANCELLED | OUTPATIENT
Start: 2024-05-09

## 2024-05-09 RX ORDER — SODIUM CHLORIDE 0.9 % (FLUSH) 0.9 %
5-40 SYRINGE (ML) INJECTION EVERY 12 HOURS SCHEDULED
Status: CANCELLED | OUTPATIENT
Start: 2024-05-09

## 2024-05-09 RX ORDER — SODIUM CHLORIDE 9 MG/ML
INJECTION, SOLUTION INTRAVENOUS PRN
Status: CANCELLED | OUTPATIENT
Start: 2024-05-09

## 2024-05-10 ENCOUNTER — HOSPITAL ENCOUNTER (OUTPATIENT)
Age: 49
Setting detail: OUTPATIENT SURGERY
Discharge: HOME | End: 2024-05-10
Attending: OTOLARYNGOLOGY | Admitting: OTOLARYNGOLOGY
Payer: COMMERCIAL

## 2024-05-10 ENCOUNTER — ANESTHESIA (OUTPATIENT)
Dept: OPERATING ROOM | Age: 49
End: 2024-05-10
Payer: COMMERCIAL

## 2024-05-10 ENCOUNTER — ANESTHESIA EVENT (OUTPATIENT)
Dept: OPERATING ROOM | Age: 49
End: 2024-05-10
Payer: COMMERCIAL

## 2024-05-10 VITALS
RESPIRATION RATE: 12 BRPM | HEART RATE: 97 BPM | SYSTOLIC BLOOD PRESSURE: 162 MMHG | DIASTOLIC BLOOD PRESSURE: 89 MMHG | HEIGHT: 74 IN | BODY MASS INDEX: 33.75 KG/M2 | TEMPERATURE: 98.3 F | OXYGEN SATURATION: 94 % | WEIGHT: 263 LBS

## 2024-05-10 DIAGNOSIS — J34.89 NASAL VALVE BLOCKAGE: ICD-10-CM

## 2024-05-10 DIAGNOSIS — G89.18 ACUTE POST-OPERATIVE PAIN: Primary | ICD-10-CM

## 2024-05-10 DIAGNOSIS — R09.81 NASAL CONGESTION: ICD-10-CM

## 2024-05-10 DIAGNOSIS — J34.3 HYPERTROPHY OF NASAL TURBINATES: ICD-10-CM

## 2024-05-10 DIAGNOSIS — Z78.9 INTOLERANCE OF CONTINUOUS POSITIVE AIRWAY PRESSURE (CPAP) VENTILATION: ICD-10-CM

## 2024-05-10 DIAGNOSIS — J34.2 NASAL SEPTAL DEVIATION: ICD-10-CM

## 2024-05-10 DIAGNOSIS — G47.33 OSA ON CPAP: ICD-10-CM

## 2024-05-10 PROCEDURE — 2720000010 HC SURG SUPPLY STERILE: Performed by: OTOLARYNGOLOGY

## 2024-05-10 PROCEDURE — 6360000002 HC RX W HCPCS: Performed by: OTOLARYNGOLOGY

## 2024-05-10 PROCEDURE — 2500000003 HC RX 250 WO HCPCS: Performed by: NURSE ANESTHETIST, CERTIFIED REGISTERED

## 2024-05-10 PROCEDURE — 2709999900 HC NON-CHARGEABLE SUPPLY: Performed by: OTOLARYNGOLOGY

## 2024-05-10 PROCEDURE — 6360000002 HC RX W HCPCS

## 2024-05-10 PROCEDURE — 3700000001 HC ADD 15 MINUTES (ANESTHESIA): Performed by: OTOLARYNGOLOGY

## 2024-05-10 PROCEDURE — 6360000002 HC RX W HCPCS: Performed by: NURSE PRACTITIONER

## 2024-05-10 PROCEDURE — 7100000011 HC PHASE II RECOVERY - ADDTL 15 MIN: Performed by: OTOLARYNGOLOGY

## 2024-05-10 PROCEDURE — 88300 SURGICAL PATH GROSS: CPT

## 2024-05-10 PROCEDURE — 3600000004 HC SURGERY LEVEL 4 BASE: Performed by: OTOLARYNGOLOGY

## 2024-05-10 PROCEDURE — 2580000003 HC RX 258: Performed by: NURSE PRACTITIONER

## 2024-05-10 PROCEDURE — 6360000002 HC RX W HCPCS: Performed by: NURSE ANESTHETIST, CERTIFIED REGISTERED

## 2024-05-10 PROCEDURE — 6370000000 HC RX 637 (ALT 250 FOR IP): Performed by: OTOLARYNGOLOGY

## 2024-05-10 PROCEDURE — 7100000001 HC PACU RECOVERY - ADDTL 15 MIN: Performed by: OTOLARYNGOLOGY

## 2024-05-10 PROCEDURE — 7100000000 HC PACU RECOVERY - FIRST 15 MIN: Performed by: OTOLARYNGOLOGY

## 2024-05-10 PROCEDURE — 2500000003 HC RX 250 WO HCPCS: Performed by: OTOLARYNGOLOGY

## 2024-05-10 PROCEDURE — 3700000000 HC ANESTHESIA ATTENDED CARE: Performed by: OTOLARYNGOLOGY

## 2024-05-10 PROCEDURE — 7100000010 HC PHASE II RECOVERY - FIRST 15 MIN: Performed by: OTOLARYNGOLOGY

## 2024-05-10 PROCEDURE — 88305 TISSUE EXAM BY PATHOLOGIST: CPT

## 2024-05-10 PROCEDURE — 3600000014 HC SURGERY LEVEL 4 ADDTL 15MIN: Performed by: OTOLARYNGOLOGY

## 2024-05-10 PROCEDURE — 30520 REPAIR OF NASAL SEPTUM: CPT | Performed by: NURSE PRACTITIONER

## 2024-05-10 PROCEDURE — APPNB45 APP NON BILLABLE 31-45 MINUTES: Performed by: NURSE PRACTITIONER

## 2024-05-10 PROCEDURE — 2580000003 HC RX 258: Performed by: OTOLARYNGOLOGY

## 2024-05-10 RX ORDER — PROPOFOL 10 MG/ML
INJECTION, EMULSION INTRAVENOUS CONTINUOUS PRN
Status: DISCONTINUED | OUTPATIENT
Start: 2024-05-10 | End: 2024-05-10 | Stop reason: SDUPTHER

## 2024-05-10 RX ORDER — SODIUM CHLORIDE 0.9 % (FLUSH) 0.9 %
5-40 SYRINGE (ML) INJECTION PRN
Status: DISCONTINUED | OUTPATIENT
Start: 2024-05-10 | End: 2024-05-10 | Stop reason: HOSPADM

## 2024-05-10 RX ORDER — HYDROCODONE BITARTRATE AND ACETAMINOPHEN 5; 325 MG/1; MG/1
1 TABLET ORAL ONCE
Status: COMPLETED | OUTPATIENT
Start: 2024-05-10 | End: 2024-05-10

## 2024-05-10 RX ORDER — ROCURONIUM BROMIDE 10 MG/ML
INJECTION, SOLUTION INTRAVENOUS PRN
Status: DISCONTINUED | OUTPATIENT
Start: 2024-05-10 | End: 2024-05-10 | Stop reason: SDUPTHER

## 2024-05-10 RX ORDER — SODIUM CHLORIDE 0.9 % (FLUSH) 0.9 %
5-40 SYRINGE (ML) INJECTION EVERY 12 HOURS SCHEDULED
Status: DISCONTINUED | OUTPATIENT
Start: 2024-05-10 | End: 2024-05-10 | Stop reason: HOSPADM

## 2024-05-10 RX ORDER — HYDRALAZINE HYDROCHLORIDE 20 MG/ML
INJECTION INTRAMUSCULAR; INTRAVENOUS
Status: DISCONTINUED
Start: 2024-05-10 | End: 2024-05-10 | Stop reason: WASHOUT

## 2024-05-10 RX ORDER — ONDANSETRON 2 MG/ML
INJECTION INTRAMUSCULAR; INTRAVENOUS PRN
Status: DISCONTINUED | OUTPATIENT
Start: 2024-05-10 | End: 2024-05-10 | Stop reason: SDUPTHER

## 2024-05-10 RX ORDER — FENTANYL CITRATE 50 UG/ML
INJECTION, SOLUTION INTRAMUSCULAR; INTRAVENOUS PRN
Status: DISCONTINUED | OUTPATIENT
Start: 2024-05-10 | End: 2024-05-10 | Stop reason: SDUPTHER

## 2024-05-10 RX ORDER — NALOXONE HYDROCHLORIDE 0.4 MG/ML
INJECTION, SOLUTION INTRAMUSCULAR; INTRAVENOUS; SUBCUTANEOUS PRN
Status: DISCONTINUED | OUTPATIENT
Start: 2024-05-10 | End: 2024-05-10 | Stop reason: HOSPADM

## 2024-05-10 RX ORDER — FENTANYL CITRATE 50 UG/ML
50 INJECTION, SOLUTION INTRAMUSCULAR; INTRAVENOUS EVERY 5 MIN PRN
Status: DISCONTINUED | OUTPATIENT
Start: 2024-05-10 | End: 2024-05-10 | Stop reason: HOSPADM

## 2024-05-10 RX ORDER — DEXAMETHASONE SODIUM PHOSPHATE 10 MG/ML
10 INJECTION, EMULSION INTRAMUSCULAR; INTRAVENOUS ONCE
Status: COMPLETED | OUTPATIENT
Start: 2024-05-10 | End: 2024-05-10

## 2024-05-10 RX ORDER — PROPOFOL 10 MG/ML
INJECTION, EMULSION INTRAVENOUS PRN
Status: DISCONTINUED | OUTPATIENT
Start: 2024-05-10 | End: 2024-05-10 | Stop reason: SDUPTHER

## 2024-05-10 RX ORDER — HYDROCODONE BITARTRATE AND ACETAMINOPHEN 5; 325 MG/1; MG/1
1 TABLET ORAL EVERY 6 HOURS PRN
Qty: 28 TABLET | Refills: 0 | Status: SHIPPED | OUTPATIENT
Start: 2024-05-10 | End: 2024-05-17

## 2024-05-10 RX ORDER — CIPROFLOXACIN 500 MG/1
500 TABLET, FILM COATED ORAL 2 TIMES DAILY
Qty: 20 TABLET | Refills: 0 | Status: SHIPPED | OUTPATIENT
Start: 2024-05-10 | End: 2024-05-20

## 2024-05-10 RX ORDER — LIDOCAINE HYDROCHLORIDE 40 MG/ML
INJECTION, SOLUTION RETROBULBAR PRN
Status: DISCONTINUED | OUTPATIENT
Start: 2024-05-10 | End: 2024-05-10 | Stop reason: ALTCHOICE

## 2024-05-10 RX ORDER — OXYMETAZOLINE HYDROCHLORIDE 0.05 G/100ML
SPRAY NASAL PRN
Status: DISCONTINUED | OUTPATIENT
Start: 2024-05-10 | End: 2024-05-10 | Stop reason: ALTCHOICE

## 2024-05-10 RX ORDER — IPRATROPIUM BROMIDE AND ALBUTEROL SULFATE 2.5; .5 MG/3ML; MG/3ML
1 SOLUTION RESPIRATORY (INHALATION) EVERY 4 HOURS PRN
Status: DISCONTINUED | OUTPATIENT
Start: 2024-05-10 | End: 2024-05-10 | Stop reason: HOSPADM

## 2024-05-10 RX ORDER — SODIUM CHLORIDE 9 MG/ML
INJECTION, SOLUTION INTRAVENOUS PRN
Status: DISCONTINUED | OUTPATIENT
Start: 2024-05-10 | End: 2024-05-10 | Stop reason: HOSPADM

## 2024-05-10 RX ORDER — LIDOCAINE HCL/PF 100 MG/5ML
SYRINGE (ML) INJECTION PRN
Status: DISCONTINUED | OUTPATIENT
Start: 2024-05-10 | End: 2024-05-10 | Stop reason: SDUPTHER

## 2024-05-10 RX ORDER — FENTANYL CITRATE 50 UG/ML
INJECTION, SOLUTION INTRAMUSCULAR; INTRAVENOUS
Status: COMPLETED
Start: 2024-05-10 | End: 2024-05-10

## 2024-05-10 RX ADMIN — PROPOFOL 20 MG: 10 INJECTION, EMULSION INTRAVENOUS at 12:42

## 2024-05-10 RX ADMIN — PROPOFOL 100 MG: 10 INJECTION, EMULSION INTRAVENOUS at 12:54

## 2024-05-10 RX ADMIN — SODIUM CHLORIDE 50 ML/HR: 9 INJECTION, SOLUTION INTRAVENOUS at 08:36

## 2024-05-10 RX ADMIN — PROPOFOL 10 MG: 10 INJECTION, EMULSION INTRAVENOUS at 12:38

## 2024-05-10 RX ADMIN — SUGAMMADEX 200 MG: 100 INJECTION, SOLUTION INTRAVENOUS at 14:59

## 2024-05-10 RX ADMIN — ROCURONIUM BROMIDE 50 MG: 10 INJECTION INTRAVENOUS at 12:54

## 2024-05-10 RX ADMIN — ROCURONIUM BROMIDE 10 MG: 10 INJECTION INTRAVENOUS at 14:11

## 2024-05-10 RX ADMIN — HYDROCODONE BITARTRATE AND ACETAMINOPHEN 1 TABLET: 5; 325 TABLET ORAL at 16:33

## 2024-05-10 RX ADMIN — ONDANSETRON 4 MG: 2 INJECTION INTRAMUSCULAR; INTRAVENOUS at 14:27

## 2024-05-10 RX ADMIN — FENTANYL CITRATE 50 MCG: 50 INJECTION, SOLUTION INTRAMUSCULAR; INTRAVENOUS at 15:25

## 2024-05-10 RX ADMIN — FENTANYL CITRATE 100 MCG: 50 INJECTION INTRAMUSCULAR; INTRAVENOUS at 12:54

## 2024-05-10 RX ADMIN — DEXAMETHASONE SODIUM PHOSPHATE 10 MG: 10 INJECTION, EMULSION INTRAMUSCULAR; INTRAVENOUS at 09:53

## 2024-05-10 RX ADMIN — PIPERACILLIN SODIUM AND TAZOBACTAM SODIUM 3375 MG: 3; .375 INJECTION, POWDER, LYOPHILIZED, FOR SOLUTION INTRAVENOUS at 12:39

## 2024-05-10 RX ADMIN — FENTANYL CITRATE 50 MCG: 50 INJECTION INTRAMUSCULAR; INTRAVENOUS at 14:11

## 2024-05-10 RX ADMIN — PROPOFOL 10 MG: 10 INJECTION, EMULSION INTRAVENOUS at 12:48

## 2024-05-10 RX ADMIN — Medication 100 MG: at 12:54

## 2024-05-10 RX ADMIN — FENTANYL CITRATE 50 MCG: 50 INJECTION INTRAMUSCULAR; INTRAVENOUS at 15:25

## 2024-05-10 RX ADMIN — FENTANYL CITRATE 50 MCG: 50 INJECTION INTRAMUSCULAR; INTRAVENOUS at 14:28

## 2024-05-10 RX ADMIN — PROPOFOL 100 MCG/KG/MIN: 10 INJECTION, EMULSION INTRAVENOUS at 12:32

## 2024-05-10 RX ADMIN — FENTANYL CITRATE 50 MCG: 50 INJECTION INTRAMUSCULAR; INTRAVENOUS at 13:53

## 2024-05-10 RX ADMIN — PROPOFOL 20 MG: 10 INJECTION, EMULSION INTRAVENOUS at 12:46

## 2024-05-10 RX ADMIN — PROPOFOL 20 MG: 10 INJECTION, EMULSION INTRAVENOUS at 12:50

## 2024-05-10 ASSESSMENT — PAIN DESCRIPTION - LOCATION
LOCATION: HEAD
LOCATION: FACE;NOSE

## 2024-05-10 ASSESSMENT — PAIN DESCRIPTION - DESCRIPTORS
DESCRIPTORS: SORE
DESCRIPTORS: ACHING;DISCOMFORT;THROBBING

## 2024-05-10 ASSESSMENT — PAIN - FUNCTIONAL ASSESSMENT
PAIN_FUNCTIONAL_ASSESSMENT: ACTIVITIES ARE NOT PREVENTED
PAIN_FUNCTIONAL_ASSESSMENT: 0-10
PAIN_FUNCTIONAL_ASSESSMENT: ACTIVITIES ARE NOT PREVENTED

## 2024-05-10 ASSESSMENT — PAIN DESCRIPTION - PAIN TYPE: TYPE: SURGICAL PAIN

## 2024-05-10 ASSESSMENT — PAIN SCALES - GENERAL
PAINLEVEL_OUTOF10: 5
PAINLEVEL_OUTOF10: 4
PAINLEVEL_OUTOF10: 6
PAINLEVEL_OUTOF10: 7

## 2024-05-10 ASSESSMENT — PAIN DESCRIPTION - ORIENTATION
ORIENTATION: ANTERIOR
ORIENTATION: INNER

## 2024-05-10 ASSESSMENT — PAIN DESCRIPTION - ONSET: ONSET: GRADUAL

## 2024-05-10 ASSESSMENT — PAIN DESCRIPTION - FREQUENCY: FREQUENCY: INTERMITTENT

## 2024-05-10 NOTE — ANESTHESIA POSTPROCEDURE EVALUATION
Department of Anesthesiology  Postprocedure Note    Patient: Carlin Bowling  MRN: [id number]  YOB: 1975  Date of evaluation: 5/10/2024    Procedure Summary       Date: 05/10/24 Room / Location: [facility] OR [address] [place] [address] OR    Anesthesia Start: 1229 Anesthesia Stop: 1514    Procedures:       Septoplasty Bilateral Inferior Turbinate Reduction Bilateral Nasal Valve Repair with Implant (Bilateral: Nose)      DRUG INDUCED SLEEP ENDOSCOPY Diagnosis:       HERB on CPAP      Nasal congestion      Hypertrophy of nasal turbinates      Intolerance of continuous positive airway pressure (CPAP) ventilation      Nasal septal deviation      Nasal valve blockage      (HERB on CPAP [G47.33])      (Nasal congestion [R09.81])      (Hypertrophy of nasal turbinates [J34.3])      (Intolerance of continuous positive airway pressure (CPAP) ventilation [Z78.9])      (Nasal septal deviation [J34.2])      (Nasal valve blockage [J34.89])    Surgeons: Sandro Sams MD Responsible Provider: Carlin Stern DO    Anesthesia Type: general ASA Status: 2            Anesthesia Type: No value filed.    Jluis Phase I: Jluis Score: 10    Jluis Phase II: Jluis Score: 10    Anesthesia Post Evaluation    Patient location during evaluation: PACU  Patient participation: complete - patient participated  Level of consciousness: awake  Airway patency: patent  Nausea & Vomiting: no nausea  Cardiovascular status: hemodynamically stable  Respiratory status: acceptable  Hydration status: stable  Pain management: adequate    No notable events documented.

## 2024-05-10 NOTE — PROGRESS NOTES
1511 Pt arrives to PACU, not yet alert to voice. Resp easy and unlabored with oral airway in place and 6L NC applied via oral airway.     1520 Pt awakens, oral airway removed. Pt states pain is minimal at this time.     1525 Pt states pain is worsening, rates it a 7/10. 50 mcg of Fentanyl given.     1530 Face tent applied.     1535 Pt states pain is a 5/10 and tolerable at this time. Resp easy and unlabored on face tent. VSS    1545 Pt meets criteria for discharge from PACU at this time, transported to [facility] in stable condition.     1550 Report given to Oni JAVIER

## 2024-05-10 NOTE — PROGRESS NOTES

## 2024-05-10 NOTE — DISCHARGE INSTRUCTIONS
Instructions specifically for  from Dr. Sandro Sams:  1.  Rest; sleep propped up or in an easy chair/recliner for the first 2-3 nights  2.  Apply antibiotic ointment to the nostrils with a Q-tip three times daily  3.  Do not rub or blow your nose until after the splints have been removed  4.  Change your nasal drip pad twice a day and as needed for saturation; you can stop placing a drip pad once your nose no longer has bloody discharge  5.  For the first 2 to 3 days you may need to change the drip pad several times per day and at short intervals even once an hour  6.  If your bleeding becomes profuse, lean forward and allowed to flow out of your nose and come to the emergency department to be evaluated.  Have me or one of my colleagues contacted to examine you.  It is possible you will need a nasal cautery procedure.  Fortunately, this is very unlikely.  7.  May use Afrin (oxymetazoline) nasal spray 1-2 spray in each nostril if increased bleeding.  Can repeat 2 hours later if needed.    For emergencies:  Sandro Sams MD  Cell: [phone]

## 2024-05-10 NOTE — ANESTHESIA PRE PROCEDURE
Department of Anesthesiology  Preprocedure Note       Name:  Carlin Bowling   Age:  48 y.o.  :  1975                                          MRN:  [id number]         Date:  5/10/2024      Surgeon: Surgeon(s):  Sandro Sams MD    Procedure: Procedure(s):  Septoplasty Bilateral Inferior Turbinate Reduction Bilateral Nasal Valve Repair with Implant  DRUG INDUCED SLEEP ENDOSCOPY    Medications prior to admission:   Prior to Admission medications    Medication Sig Start Date End Date Taking? Authorizing Provider   losartan-hydroCHLOROthiazide (HYZAAR) 50-12.5 MG per tablet take 1 tablet by mouth once daily 24   Lauri Dillard APRN - CNP   Potassium Chloride 10 MEQ PACK Take by mouth daily    Provider, MD Lacy   amLODIPine (NORVASC) 10 MG tablet Take 1 tablet by mouth daily  Patient taking differently: Take by mouth daily In evening 23   Leda Deutsch MD       Current medications:    Current Facility-Administered Medications   Medication Dose Route Frequency Provider Last Rate Last Admin   • piperacillin-tazobactam (ZOSYN) 3,375 mg in sodium chloride 0.9 % 50 mL IVPB (mini-bag)  3,375 mg IntraVENous Once Monserrat Guy APRN - ZAHEER       • dexAMETHasone (PF) (DECADRON) injection 10 mg  10 mg IntraVENous Once Monserrat Guy APRN - ZAHEER       • ipratropium 0.5 mg-albuterol 2.5 mg (DUONEB) nebulizer solution 1 Dose  1 Dose Inhalation Q4H PRN Monserrat Guy APRN - CNP       • sodium chloride flush 0.9 % injection 5-40 mL  5-40 mL IntraVENous 2 times per day Monserrat Guy APRN - CNP       • sodium chloride flush 0.9 % injection 5-40 mL  5-40 mL IntraVENous PRN Monserrat Guy APRN - CNP       • 0.9 % sodium chloride infusion   IntraVENous PRN Monserrat Guy APRN - CNP 50 mL/hr at 05/10/24 0836 50 mL/hr at 05/10/24 0836       Allergies:  No Known Allergies    Problem List:    Patient Active Problem List   Diagnosis Code   • Fatigue R53.83   • Inadequate sleep

## 2024-05-10 NOTE — PROGRESS NOTES
Pt returned to [facility] room 19. Vitals and assessment as charted. 0.9 infusing, @500ml to count from PACU. Pt has snack and drink. Family at the bedside. Pt and family verbalized understanding of discharge criteria and call light use. Call light in reach.

## 2024-05-10 NOTE — H&P
Adapted from prior ENT note:    Nasal congestion and obstruction; Nasal septal deviation; Inferior turbinate hypertrophy; Nasal calce collapse  No new symptoms    Past Medical History:   Diagnosis Date    Allergic rhinitis     Anxiety     Chronic sinusitis     Depression     Enlarged thyroid     w/nodules    Hypertension     Hypokalemia     Obesity     Prolonged emergence from general anesthesia     Sleep apnea     cpap       Past Surgical History:   Procedure Laterality Date    APPENDECTOMY      COLONOSCOPY  10/2023       No Known Allergies    Current Facility-Administered Medications   Medication Dose Route Frequency Provider Last Rate Last Admin    piperacillin-tazobactam (ZOSYN) 3,375 mg in sodium chloride 0.9 % 50 mL IVPB (mini-bag)  3,375 mg IntraVENous Once Monserrat Guy APRN - CNP        ipratropium 0.5 mg-albuterol 2.5 mg (DUONEB) nebulizer solution 1 Dose  1 Dose Inhalation Q4H PRN Monserrat Guy APRN - ZAHEER        sodium chloride flush 0.9 % injection 5-40 mL  5-40 mL IntraVENous 2 times per day Monserrat Guy APRN - ZAHEER        sodium chloride flush 0.9 % injection 5-40 mL  5-40 mL IntraVENous PRN Monserrat Guy APRN - CNP        0.9 % sodium chloride infusion   IntraVENous PRN Monserrat Guy APRN - CNP 50 mL/hr at 05/10/24 0836 50 mL/hr at 05/10/24 0836       Current vitals  BP (!) 143/81   Pulse 67   Temp 97.1 °F (36.2 °C) (Tympanic)   Resp 16   Ht 1.88 m (6' 2\")   Wt 119.3 kg (263 lb)   SpO2 96%   BMI 33.77 kg/m²     Proceed with original surgical plan:  Septoplasty Bilateral Inferior Turbinate Reduction Bilateral Nasal Valve Repair with Implant ; Drug induced sleep endoscopy    Electronically signed by DANA Palacio CNP on 5/10/2024 at 11:59 AM      -----------------------------------------  -----------------------------------------      [facility]SE [facility] THROAT  [address] [facility]  [address] [place] [address]  tablet by mouth daily 30 tablet 11      No current facility-administered medications for this visit.         Past Medical History        Past Medical History:   Diagnosis Date    Hypertension           Past Surgical History         Past Surgical History:   Procedure Laterality Date    APPENDECTOMY             Family History   No family history on file.     Social History            Tobacco Use    Smoking status: Never       Passive exposure: Never    Smokeless tobacco: Former       Types: Snuff       Quit date: 1/1/2010   Substance Use Topics    Alcohol use: Yes       Alcohol/week: 0.0 standard drinks of alcohol       Comment: socially                    Subjective:      Review of Systems  Rest of review of systems are negative, except as noted in HPI.      Objective:      /70 (Site: Right Upper Arm, Position: Sitting)   Pulse 88   Temp 97.2 °F (36.2 °C) (Infrared)   Resp 18   Ht 1.88 m (6' 2.02\")   Wt 121.1 kg (267 lb)   SpO2 95%   BMI 34.27 kg/m²      Physical Exam         On general physical exam the patient is a pleasant alert cooperative moderately abdominally obese adult male in no acute distress.  His voice and speech pattern are abnormal for the presence of moderate hyponasality.  On anterior SPECT of the patient's nasal airway was abnormal for the presence of a deviated left going nasal septum with very broad maxillary crest and inferior turbinate hypertrophy bilaterally.  His crest is making contact with the left inferior turbinate as is the septum posteriorly.  Both sides have no concavity, only convexity as high as can be seen on the turbinates.  No nasal polyps are visible anteriorly.  No purulence or signs of recent bleeding were seen.  On Sleep Center inspection the patient's oropharynx was abnormal for the presence of a tall tongue base and along soft palate with normal entry to the nasopharynx and a class I Mallampati aperture.  The entire length of his uvula was seen without having to

## 2024-05-11 PROBLEM — J34.89 NASAL OBSTRUCTION: Status: ACTIVE | Noted: 2024-05-11

## 2024-05-11 NOTE — OP NOTE
subperiosteal space along the maxillary crest which I the removed with a bone chisel and mallet in the usual manner.  This was also location where multiple layers of cartilage were seen abutting thickened bone consistent with trauma.  Suction cautery was necessary for hemostasis of the maxillary perforating artery.  As I took this dissection posteriorly through the remainder of the maxillary crest I encountered additional hypertrophic maxillary crest which I chiseled away and removed.   I achieved hemostasis with suction cautery and cauterized any bleeding vessels on the septum as well.  I irrigated out the nasal septum with copius amounts of saline and turned my attention to the inferior turbinates.     Bilateral inferior turbinate reduction: Using a 35 wide insulated bipolar I cauterized the inferior aspect of the left and right inferior turbinates with 2 thorough passes from posterior to anterior.  I then used straight cutting turbinate scissors to remove the rim of cauterized tissue below the level of cautery so as to maintain adequate hemostasis.  I used suction cautery to produce additional hemostasis.  I handed both specimens off separately to be sent in formalin for permanent section.     Bilateral nasal valve reconstruction: Injecting an additional 1 cc of 1-50,000 saline epinephrine about the lateral nasal ala on the inside of the nostril at the breakpoint of the nasal crease, I used a 15 blade to incise this tissue parallel to the nasal crease and then dissected orthogonal to the crease of the lateral to the lateral sofi of the nostril and the proximal part of the lateral nasal polypoid mucosa.  I then resected an ovoid piece of the redundant epithelium and subepithelial on both sides making an ovoid incision that would draw the lateral epithelium more tightly and improve airflow.  I did this on both sides and measured the distance of the pocket to be approximately 12 to 15 mm.  The dissection was carried  satisfactory condition.     Estimated blood loss in the case was approximately less than 50 cc and the patient received approximately 2 L of intravenous lactated Ringer's intraoperatively.  No blood products were transfused.  No intraoperative complications were detected.  Counts were considered accurate x 3.     I was present for and participated in the patient's entire operation up to the hemitransfixion incision.  Monserrat THOMPSON, assisted me various points to the surgery including with the closure of the septal incision and the placement of the splints as described above.      Electronically signed by Sandro Sams MD on 5/11/2024 at 11:55 AM

## 2024-05-12 PROBLEM — J38.00 VOCAL CORD PARALYSIS: Status: ACTIVE | Noted: 2024-05-12

## 2024-05-20 ENCOUNTER — TELEPHONE (OUTPATIENT)
Dept: ENT CLINIC | Age: 49
End: 2024-05-20

## 2024-05-20 ENCOUNTER — OFFICE VISIT (OUTPATIENT)
Dept: ENT CLINIC | Age: 49
End: 2024-05-20
Payer: COMMERCIAL

## 2024-05-20 VITALS
OXYGEN SATURATION: 96 % | DIASTOLIC BLOOD PRESSURE: 74 MMHG | BODY MASS INDEX: 33.74 KG/M2 | HEART RATE: 83 BPM | TEMPERATURE: 97.1 F | RESPIRATION RATE: 18 BRPM | HEIGHT: 74 IN | WEIGHT: 262.9 LBS | SYSTOLIC BLOOD PRESSURE: 118 MMHG

## 2024-05-20 DIAGNOSIS — G47.33 OSA ON CPAP: ICD-10-CM

## 2024-05-20 DIAGNOSIS — Z98.890 STATUS POST NASAL SEPTOPLASTY: Primary | ICD-10-CM

## 2024-05-20 DIAGNOSIS — Z78.9 INTOLERANCE OF CONTINUOUS POSITIVE AIRWAY PRESSURE (CPAP) VENTILATION: ICD-10-CM

## 2024-05-20 DIAGNOSIS — R09.81 NASAL CONGESTION: ICD-10-CM

## 2024-05-20 PROCEDURE — 99024 POSTOP FOLLOW-UP VISIT: CPT | Performed by: OTOLARYNGOLOGY

## 2024-05-20 PROCEDURE — 31237 NSL/SINS NDSC SURG BX POLYPC: CPT | Performed by: OTOLARYNGOLOGY

## 2024-05-20 NOTE — PROGRESS NOTES
[facility]  [facility] EAR, NOSE AND THROAT  [address]  [address]  [facility] [address]  Dept: [phone]  Dept Fax: [phone]  Loc: [phone]    Carlin Bowling is a 48 y.o. male who was referred by No ref. provider found for:  Chief Complaint   Patient presents with    Post-Op Check     Patient is here post op dise, septo, inf turbs, nasal valve 5/10. Patient states he is feeling pretty congested and he states that the roof of his mouth and his top teeth get random sensations of numbness and he says that it comes and goes.        HPI:     Carlin Bowling is a 48 y.o. male obstructive sleep apnea patient status post DISE drug-induced sleep endoscopy to determine he is a candidate for the Inspire hypoglossal nerve stimulator.  This was set up as part of his surgical needs to undergo nasal septal reconstruction, inferior turbinate reduction, and nasal valve repair.  He comes in for his first visit to have his splints removed.  He reports not being able to breathe at all through his nose with the splints in place.  Other than that he has had no problems.     History:     No Known Allergies  Current Outpatient Medications   Medication Sig Dispense Refill    ciprofloxacin (CIPRO) 500 MG tablet Take 1 tablet by mouth 2 times daily for 10 days 20 tablet 0    losartan-hydroCHLOROthiazide (HYZAAR) 50-12.5 MG per tablet take 1 tablet by mouth once daily 90 tablet 1    Potassium Chloride 10 MEQ PACK Take by mouth daily      amLODIPine (NORVASC) 10 MG tablet Take 1 tablet by mouth daily (Patient taking differently: Take by mouth daily In evening) 90 tablet 3     No current facility-administered medications for this visit.     Past Medical History:   Diagnosis Date    Allergic rhinitis     Anxiety     Chronic sinusitis     Depression     Enlarged thyroid     w/nodules    Hypertension     Hypokalemia     Obesity     Prolonged emergence from general anesthesia     Sleep apnea

## 2024-05-20 NOTE — TELEPHONE ENCOUNTER
After reviewing his chart, it looks like his last sleep study was in 2015 (unless he had one somewhere else more recent).   If not, he will need an updated sleep study and follow up with Julianna Mendoza in the sleep office.    As far as I can tell his last 2 studies were done in 2015 with [facility] notes. Please confirm.    11/12/15, [facility] 16.1  12/30/15, [facility] 0.8

## 2024-05-20 NOTE — TELEPHONE ENCOUNTER
Patient stopped in my office to schedule Inspire implant. I do not have order for this. Can you please place Inspire surgery orders if that is the next phase for him? Thank you!

## 2024-05-23 NOTE — TELEPHONE ENCOUNTER
Dr Sams said the patient passed his DISE procedure (this was completed at the same time he had his nasal congestion surgery) and that it was ok to \"hold\" surgery time. I contacted the patient to let him know that I will try to hold some surgery time for him but Dr Sams wants him to go back to see Julianna Mendoza in the sleep office since his sleep study has not been updated since 2015. The patient is aware the surgery date is very tentative based on the sleep visit and updated sleep study results as well.      I spoke to Julianna Mendoza and she is aware the patient will be coming back to see her.    Patient understood.

## 2024-06-05 ENCOUNTER — OFFICE VISIT (OUTPATIENT)
Dept: ENT CLINIC | Age: 49
End: 2024-06-05
Payer: COMMERCIAL

## 2024-06-05 VITALS
DIASTOLIC BLOOD PRESSURE: 68 MMHG | OXYGEN SATURATION: 96 % | SYSTOLIC BLOOD PRESSURE: 138 MMHG | RESPIRATION RATE: 18 BRPM | HEIGHT: 74 IN | HEART RATE: 67 BPM | BODY MASS INDEX: 34.25 KG/M2 | TEMPERATURE: 97 F | WEIGHT: 266.9 LBS

## 2024-06-05 DIAGNOSIS — G47.33 OSA ON CPAP: ICD-10-CM

## 2024-06-05 DIAGNOSIS — Z78.9 INTOLERANCE OF CONTINUOUS POSITIVE AIRWAY PRESSURE (CPAP) VENTILATION: ICD-10-CM

## 2024-06-05 DIAGNOSIS — R09.81 NASAL CONGESTION: ICD-10-CM

## 2024-06-05 DIAGNOSIS — Z98.890 STATUS POST NASAL SEPTOPLASTY: Primary | ICD-10-CM

## 2024-06-05 PROCEDURE — 99024 POSTOP FOLLOW-UP VISIT: CPT | Performed by: OTOLARYNGOLOGY

## 2024-06-05 PROCEDURE — 31237 NSL/SINS NDSC SURG BX POLYPC: CPT | Performed by: OTOLARYNGOLOGY

## 2024-06-05 NOTE — PROGRESS NOTES
[facility] [place] [facility]  [facility] EAR, NOSE AND THROAT  [address]  [facility] [address]  Dept: [phone]  Dept Fax: [phone]  Loc: [phone]    Carlin Bowling is a 48 y.o. male who was referred by No ref. provider found for:  Chief Complaint   Patient presents with    Post-Op Check     Post op.   DISE, Septo, Inf turbs, nasal valve.   Holding 9/13 for Implant since there was no sleep study since 2015.  He said that he feels more mucus buildup and increased pressure on the left side.    He is using the saline spray and he finds it helpful.   He is using his CPAP and started on Sunday.  He still have some sensitivity to this sinuses.       HPI:     Carlin Bowling is a 48 y.o. male 1 month status post nasal septal reconstruction, bilateral inferior turbinate reduction, and bilateral nasal valve repair.  The patient reports having had some crusting and nasal congestion particularly on his left side and is concerned that his problem is beginning to return.  He is also pending a repeat sleep study which was not performed ahead of his referral that has yet to be rescheduled.  Until that is done the patient cannot be scheduled for his Inspire hypoglossal nerve stimulator placement.     History:     No Known Allergies  Current Outpatient Medications   Medication Sig Dispense Refill    losartan-hydroCHLOROthiazide (HYZAAR) 50-12.5 MG per tablet take 1 tablet by mouth once daily 90 tablet 1    Potassium Chloride 10 MEQ PACK Take by mouth daily      amLODIPine (NORVASC) 10 MG tablet Take 1 tablet by mouth daily (Patient taking differently: Take by mouth daily In evening) 90 tablet 3     No current facility-administered medications for this visit.     Past Medical History:   Diagnosis Date    Allergic rhinitis     Anxiety     Chronic sinusitis     Depression     Enlarged thyroid     w/nodules    Hypertension     Hypokalemia     Obesity     Prolonged emergence from general

## 2024-06-07 ENCOUNTER — TELEPHONE (OUTPATIENT)
Dept: PULMONOLOGY | Age: 49
End: 2024-06-07

## 2024-06-07 NOTE — TELEPHONE ENCOUNTER
Has not been able to use CPAP from 05/10/2024 to 06/02/2024 d/t nasal surgery. Will bring pap to appt on 06/11 for download.

## 2024-06-10 NOTE — PROGRESS NOTES
134/82 (Site: Right Upper Arm, Position: Sitting, Cuff Size: Large Adult)   Pulse 67   Temp 98.2 °F (36.8 °C) (Oral)   Ht 1.88 m (6' 2\")   Wt 121.7 kg (268 lb 3.2 oz)   SpO2 95% Comment: r/a  BMI 34.43 kg/m²       Physical Exam  Constitutional:       Appearance: Normal appearance. He is normal weight.   HENT:      Head: Normocephalic and atraumatic.      Right Ear: External ear normal.      Left Ear: External ear normal.      Nose: Nose normal.   Eyes:      Extraocular Movements: Extraocular movements intact.      Conjunctiva/sclera: Conjunctivae normal.      Pupils: Pupils are equal, round, and reactive to light.   Pulmonary:      Effort: Pulmonary effort is normal.   Musculoskeletal:      Cervical back: Normal range of motion and neck supple.   Neurological:      General: No focal deficit present.      Mental Status: He is alert and oriented to person, place, and time.   Psychiatric:         Attention and Perception: Attention and perception normal.         Mood and Affect: Mood and affect normal.         Speech: Speech normal.         Behavior: Behavior normal. Behavior is cooperative.         Thought Content: Thought content normal.         Cognition and Memory: Cognition normal.         Judgment: Judgment normal.           ASSESSMENT/DIAGNOSIS     Diagnosis Orders   1. Obstructive sleep apnea on CPAP        2. Obesity (BMI 30-39.9)        3. Nasal congestion        4. Psychophysiological insomnia                 Plan   Do you need any equipment today? Yes update supplies  -Patient seen in the office today to reevaluate whether or not inspire is an appropriate therapy for him and to proceed with a sleep study if appropriate.  -Discussed inspire with him in detail including all of the requirements  -Discussed with him the postop visits including the 1 month activation, 1 month post activation, 2 months post activation, fine-tune sleep study at 3 months, follow-up after sleep study, follow-up 3 to 6 months,

## 2024-06-11 ENCOUNTER — OFFICE VISIT (OUTPATIENT)
Dept: PULMONOLOGY | Age: 49
End: 2024-06-11
Payer: COMMERCIAL

## 2024-06-11 VITALS
WEIGHT: 268.2 LBS | TEMPERATURE: 98.2 F | HEART RATE: 67 BPM | DIASTOLIC BLOOD PRESSURE: 82 MMHG | HEIGHT: 74 IN | OXYGEN SATURATION: 95 % | BODY MASS INDEX: 34.42 KG/M2 | SYSTOLIC BLOOD PRESSURE: 134 MMHG

## 2024-06-11 DIAGNOSIS — E66.9 OBESITY (BMI 30-39.9): ICD-10-CM

## 2024-06-11 DIAGNOSIS — R09.81 NASAL CONGESTION: ICD-10-CM

## 2024-06-11 DIAGNOSIS — G47.33 OBSTRUCTIVE SLEEP APNEA ON CPAP: Primary | ICD-10-CM

## 2024-06-11 DIAGNOSIS — F51.04 PSYCHOPHYSIOLOGICAL INSOMNIA: ICD-10-CM

## 2024-06-11 PROCEDURE — 3075F SYST BP GE 130 - 139MM HG: CPT | Performed by: PHYSICIAN ASSISTANT

## 2024-06-11 PROCEDURE — 3079F DIAST BP 80-89 MM HG: CPT | Performed by: PHYSICIAN ASSISTANT

## 2024-06-11 PROCEDURE — 99215 OFFICE O/P EST HI 40 MIN: CPT | Performed by: PHYSICIAN ASSISTANT

## 2024-06-11 ASSESSMENT — ENCOUNTER SYMPTOMS
ALLERGIC/IMMUNOLOGIC NEGATIVE: 1
CHEST TIGHTNESS: 0
WHEEZING: 0
DIARRHEA: 0
STRIDOR: 0
EYES NEGATIVE: 1
NAUSEA: 0
COUGH: 0
BACK PAIN: 0
SHORTNESS OF BREATH: 0

## 2024-06-18 ENCOUNTER — TELEPHONE (OUTPATIENT)
Dept: ENT CLINIC | Age: 49
End: 2024-06-18

## 2024-06-18 NOTE — TELEPHONE ENCOUNTER
Dr Sams, I was following up on Carlin's chart for Inspire implant. We were holding surgery time for him on 9-13-24. He had a DISE completed on 5/10/24, however, after reviewing further, I noticed he has not had a sleep study since 2015. The patient was informed he would need to see Julianna Mendoza again for evaluation and an updated sleep study.  He saw Julianna on 6/11/24.    Included below is Julianna's PLAN for the patient (see full note in encounters).  Please advise.

## 2024-06-20 ENCOUNTER — TELEPHONE (OUTPATIENT)
Dept: ENT CLINIC | Age: 49
End: 2024-06-20

## 2024-07-18 NOTE — TELEPHONE ENCOUNTER
I spoke to the patient today. He is feeling pretty good since his nasal surgery and would like to hold off for now on Inspire. He is using his CPAP as instructed for the next few months and will see Julianna Mendoza on 9/24/24 for follow up. He said she may order a new sleep study at that point. He will call us if his sleep study warrants the Inspire to get rescheduled. He said he may wait and give it more time as well.

## 2024-09-24 ENCOUNTER — OFFICE VISIT (OUTPATIENT)
Dept: PULMONOLOGY | Age: 49
End: 2024-09-24
Payer: COMMERCIAL

## 2024-09-24 VITALS
WEIGHT: 266 LBS | TEMPERATURE: 98.7 F | DIASTOLIC BLOOD PRESSURE: 80 MMHG | SYSTOLIC BLOOD PRESSURE: 124 MMHG | BODY MASS INDEX: 35.25 KG/M2 | HEIGHT: 73 IN | OXYGEN SATURATION: 97 % | HEART RATE: 65 BPM

## 2024-09-24 DIAGNOSIS — G47.33 OBSTRUCTIVE SLEEP APNEA ON CPAP: Primary | ICD-10-CM

## 2024-09-24 DIAGNOSIS — G47.30 HYPERSOMNIA WITH SLEEP APNEA: ICD-10-CM

## 2024-09-24 DIAGNOSIS — G47.10 HYPERSOMNIA WITH SLEEP APNEA: ICD-10-CM

## 2024-09-24 DIAGNOSIS — E66.01 SEVERE OBESITY (BMI 35.0-39.9) WITH COMORBIDITY: ICD-10-CM

## 2024-09-24 PROCEDURE — 3079F DIAST BP 80-89 MM HG: CPT | Performed by: PHYSICIAN ASSISTANT

## 2024-09-24 PROCEDURE — 3074F SYST BP LT 130 MM HG: CPT | Performed by: PHYSICIAN ASSISTANT

## 2024-09-24 PROCEDURE — 99214 OFFICE O/P EST MOD 30 MIN: CPT | Performed by: PHYSICIAN ASSISTANT

## 2024-09-24 ASSESSMENT — ENCOUNTER SYMPTOMS
STRIDOR: 0
WHEEZING: 0
CHEST TIGHTNESS: 0
COUGH: 0
BACK PAIN: 0
DIARRHEA: 0
ALLERGIC/IMMUNOLOGIC NEGATIVE: 1
EYES NEGATIVE: 1
NAUSEA: 0
SHORTNESS OF BREATH: 0

## 2024-11-19 RX ORDER — AZITHROMYCIN 250 MG/1
TABLET, FILM COATED ORAL
Qty: 1 PACKET | Refills: 0 | Status: SHIPPED | OUTPATIENT
Start: 2024-11-19

## 2025-02-18 ENCOUNTER — TELEPHONE (OUTPATIENT)
Dept: PULMONOLOGY | Age: 50
End: 2025-02-18

## 2025-02-18 NOTE — TELEPHONE ENCOUNTER
Pt has an appt w/Dr. Lipscomb on 2/19/25.  When pulling his DL for the appt, last use was 9/23/24.  If pt calls back, please remind him to bring his PAP machine for DL to the appt.  I had left a message stating as much.

## 2025-02-19 ENCOUNTER — OFFICE VISIT (OUTPATIENT)
Dept: PULMONOLOGY | Age: 50
End: 2025-02-19
Payer: COMMERCIAL

## 2025-02-19 VITALS
BODY MASS INDEX: 36.15 KG/M2 | TEMPERATURE: 97.8 F | HEART RATE: 85 BPM | HEIGHT: 73 IN | WEIGHT: 272.8 LBS | OXYGEN SATURATION: 97 % | DIASTOLIC BLOOD PRESSURE: 92 MMHG | SYSTOLIC BLOOD PRESSURE: 146 MMHG

## 2025-02-19 DIAGNOSIS — G47.10 HYPERSOMNIA WITH SLEEP APNEA: ICD-10-CM

## 2025-02-19 DIAGNOSIS — G47.33 OSA ON CPAP: Primary | ICD-10-CM

## 2025-02-19 DIAGNOSIS — G47.30 HYPERSOMNIA WITH SLEEP APNEA: ICD-10-CM

## 2025-02-19 DIAGNOSIS — I10 ESSENTIAL HYPERTENSION: ICD-10-CM

## 2025-02-19 PROCEDURE — 3077F SYST BP >= 140 MM HG: CPT | Performed by: INTERNAL MEDICINE

## 2025-02-19 PROCEDURE — 3079F DIAST BP 80-89 MM HG: CPT | Performed by: INTERNAL MEDICINE

## 2025-02-19 PROCEDURE — 99213 OFFICE O/P EST LOW 20 MIN: CPT | Performed by: INTERNAL MEDICINE

## 2025-02-19 NOTE — PATIENT INSTRUCTIONS
Recommendations/Plan:  -He was advised to continue current positive airway pressure therapy with above described pressure.  -He was advised to keep good compliance with current recommended pressure to get optimal results and clinical improvement.  -He was advised to continue to practice good sleep hygiene practices.  -He was advised to loose weight by controlling diet and doing exercise.  -He was instructed to extend his sleep schedule to 7 to 9 hours in a given 24hour period continuously.  -Follow with my clinic/Mr. Efraín Martinez CNP in 12months for clinical reevaluation with review of download. Will reschedule his follow-up appointment with Mr. Efraín Martinez from May 2025 to February 2026 with the latest download from his CPAP device.  -He was advised to call [facility] regarding supplies if needed.  -He was advised to call my office for earlier appointment if needed for worsening of sleep symptoms.  -Carlin Bowling was educated about my impression and plan. Patient verbalizes understanding.

## 2025-02-19 NOTE — PROGRESS NOTES
[facility]  Sleep Medicine Clinic Follow up note. /He is an established patient of sleep medicine clinic at [facility]. He came for Sleep medicine reevaluation today. He is an old patient of Ms. Heena Mendoza PA-C. He was seen by Ms. Heena Mendoza PA-C for the last time on 9/24/24     Carlin Bowling                                           Chief complaint and [place]: Carlin Bowling is a 49 y.o.oldmale came for follow up regarding his sleep apnea. He is currently using his positive airway pressure device with a CPAP pressure of 8 cm H20. He denies any problems with machine or mask. He is sleeping well at night with out difficulty.He denies any daytime sleepiness.     Patient endorses excellent compliance with CPAP and download indicates the same, with 100% compliance of >4hrs of use over past 30 days, with average use per night [id number].     Patient has had issues in the past with depression / anxiety that interfered with sleep. [facility] reports [facility] are teaching techniques to control anxiety and calm his mind.     Patient will wake up 2-3 times per night for using restroom, and he is able to fall back asleep afterwards. He reports feeling refreshed in the morning.     He denies any sleep talking or sleep walking or bruxism.     Patient has form to fill out for facilitating continuation of care with [facility].   Patient is happy with fit of mask and comfort / usability of machine.    Review of Systems:   General/Constitutional: he gained 6lbs of weight from the last visit with normal appetite. No fever or chills.  He gained 23 pounds of weight from his initial baseline sleep study performed in 2015  HENT: Negative.   Eyes: Negative.  Upper respiratory tract: No nasal stuffiness or post nasal drip.  Lower respiratory tract/ lungs: No cough or sputum production. No hemoptysis.  Cardiovascular: No palpitations or chest 
  Chief Complaint:  5mo HERB f/u w/download.    Mallampati airway Class:IV  Neck Circumference:18.0 Inches    [place] sleepiness score 2/18/25: {NUMBERS [id number]:[id number]}.  SAQLI:      Diagnostic Data:   PAP Download:   Recorded compliance dates:  1/20/25-2/18/25  More than 4hour usage compliance was:100%.  Average residual Apnea- Hypoapnea index on current pressue was:1.9.      PAP Type CPAP   Level  8 cmH2O     Average usuage hours per day was:6.40     Interface: Nasal Pillows    Provider:  [x][facility]  [][facility]  [][facility]  [][facility]         [][facility] []Other:     
desaturation was 91 percent.  Pressure level analysis test was  initiated at 5 cm of water. Gradually increased to 8 cm of water. At this  pressure the patient achieved REM sleep significantly with no events  recorded.  Saturations stayed above 94 percent.     CONCLUSION:      1.   Optimal C-PAP pressure of 8 cm of water.     RECOMMENDATIONS: At this point provide the patient with C-PAP machine with  blood pressure.                 DANITA Alvarez M.D.        D: 01/05/2016 15:46                                    T: 01/06/2016 10:28  ts     CC:  DANITA Alvarez M.D.  [address]  [address]  [place] [address]      Assesment:  -Moderately severe obstructive sleep apnea on treatment with a CPAP pressure of ***cm H20 - he is using his CPAP device with excellent compliance to >4 hours of therapy. His respiratory events were under good control with current therapy. His clinical symptoms improved. He is benefiting from current CPAP therapy and would like to continue the therapy.      Recommendations/Plan:  -He was advised to continue current positive airway pressure therapy with above described pressure.  -He was advised to keep good compliance with current recommended pressure to get optimal results and clinical improvement.  -Follow with my clinic in 12months for clinical reevaluation with review of download.  -He was advised to call [email or web address] regarding supplies if needed.  -He was advised to call my office for earlier appointment if needed for worsening of sleep symptoms.  -Carlin Bowling was educated about my impression and plan. Patient verbalizes understanding.      -I personally reviewed updated the Past medical hx, Past surgical hx,Social hx, Family hx, Medications, Allergies in the discrete data section of the patient chart along with labs, Pulmonary medicine,Sleep medicine related, Pathological, Microbiological and Radiological investigations.

## (undated) DEVICE — BLADE,CARBON-STEEL,15,STRL,DISPOSABLE,TB: Brand: MEDLINE

## (undated) DEVICE — PACK-MAJOR

## (undated) DEVICE — Device

## (undated) DEVICE — GLOVE SURG SZ 7.5 L11.73IN FNGR THK9.8MIL STRW LTX POLYMER

## (undated) DEVICE — FIRM 4CM: Brand: NASOPORE

## (undated) DEVICE — ENT: Brand: MEDLINE INDUSTRIES, INC.

## (undated) DEVICE — SYRINGE IRRIG 60ML SFT PLIABLE BLB EZ TO GRP 1 HND USE W/

## (undated) DEVICE — SPLINT 1524050 5PK PAIR DOYLE II AIRWAY: Brand: DOYLE II ™

## (undated) DEVICE — SUTURE MONOCRYL SZ 4 0 L18IN ABSRB UD P 3 3 8 CIR REV CUT NDL MCP494G

## (undated) DEVICE — ENTACT SEPTAL STAPLER 3 PACK: Brand: ENT SINUS

## (undated) DEVICE — SUTURE PROL SZ 2-0 L18IN NONABSORBABLE BLU FS L26MM 3/8 CIR 8685H

## (undated) DEVICE — APPLICATOR MEDICATED 10.5 CC SOLUTION CLR STRL CHLORAPREP

## (undated) DEVICE — COAGULATOR SUCT 8FR L6IN HNDL FOR ENT PROC

## (undated) DEVICE — TUBING, SUCTION, 1/4" X 20', STRAIGHT: Brand: MEDLINE INDUSTRIES, INC.